# Patient Record
Sex: MALE | Race: BLACK OR AFRICAN AMERICAN | Employment: UNEMPLOYED | URBAN - METROPOLITAN AREA
[De-identification: names, ages, dates, MRNs, and addresses within clinical notes are randomized per-mention and may not be internally consistent; named-entity substitution may affect disease eponyms.]

---

## 2017-03-10 ENCOUNTER — TELEPHONE (OUTPATIENT)
Dept: ADMINISTRATIVE | Age: 67
End: 2017-03-10

## 2017-03-16 ENCOUNTER — TELEPHONE (OUTPATIENT)
Dept: ADMINISTRATIVE | Age: 67
End: 2017-03-16

## 2017-03-17 ENCOUNTER — TELEPHONE (OUTPATIENT)
Dept: ADMINISTRATIVE | Age: 67
End: 2017-03-17

## 2017-05-12 ENCOUNTER — TELEPHONE (OUTPATIENT)
Dept: ADMINISTRATIVE | Age: 67
End: 2017-05-12

## 2017-06-17 ENCOUNTER — HOSPITAL ENCOUNTER (EMERGENCY)
Age: 67
Discharge: HOME | End: 2017-06-17
Attending: EMERGENCY MEDICINE
Payer: MEDICARE

## 2017-06-17 VITALS
TEMPERATURE: 97.7 F | DIASTOLIC BLOOD PRESSURE: 68 MMHG | HEIGHT: 73 IN | RESPIRATION RATE: 16 BRPM | HEART RATE: 101 BPM | SYSTOLIC BLOOD PRESSURE: 138 MMHG | OXYGEN SATURATION: 97 % | BODY MASS INDEX: 21.06 KG/M2 | WEIGHT: 158.9 LBS
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
## 2017-06-17 PROCEDURE — 6370000000 HC RX 637 (ALT 250 FOR IP): Performed by: NURSE PRACTITIONER

## 2017-06-17 PROCEDURE — 6360000002 HC RX W HCPCS: Performed by: NURSE PRACTITIONER

## 2017-06-17 PROCEDURE — 90715 TDAP VACCINE 7 YRS/> IM: CPT | Performed by: NURSE PRACTITIONER

## 2017-06-17 PROCEDURE — 99283 EMERGENCY DEPT VISIT LOW MDM: CPT

## 2017-06-17 PROCEDURE — 90471 IMMUNIZATION ADMIN: CPT | Performed by: NURSE PRACTITIONER

## 2017-06-17 RX ORDER — HYDROCODONE BITARTRATE AND ACETAMINOPHEN 5; 325 MG/1; MG/1
1 TABLET ORAL EVERY 6 HOURS PRN
Qty: 20 TABLET | Refills: 0 | Status: SHIPPED | OUTPATIENT
Start: 2017-06-17 | End: 2017-06-24

## 2017-06-17 RX ORDER — AMOXICILLIN AND CLAVULANATE POTASSIUM 875; 125 MG/1; MG/1
1 TABLET, FILM COATED ORAL 2 TIMES DAILY
Qty: 20 TABLET | Refills: 0 | Status: SHIPPED | OUTPATIENT
Start: 2017-06-17 | End: 2017-06-27

## 2017-06-17 RX ORDER — HYDROCODONE BITARTRATE AND ACETAMINOPHEN 5; 325 MG/1; MG/1
1 TABLET ORAL ONCE
Status: COMPLETED | OUTPATIENT
Start: 2017-06-17 | End: 2017-06-17

## 2017-06-17 RX ADMIN — HYDROCODONE BITARTRATE AND ACETAMINOPHEN 1 TABLET: 5; 325 TABLET ORAL at 12:42

## 2017-06-17 RX ADMIN — TETANUS TOXOID, REDUCED DIPHTHERIA TOXOID AND ACELLULAR PERTUSSIS VACCINE, ADSORBED 0.5 ML: 5; 2.5; 8; 8; 2.5 SUSPENSION INTRAMUSCULAR at 12:31

## 2017-06-17 ASSESSMENT — PAIN DESCRIPTION - ORIENTATION: ORIENTATION: RIGHT

## 2017-06-17 ASSESSMENT — PAIN SCALES - GENERAL
PAINLEVEL_OUTOF10: 7

## 2017-06-17 ASSESSMENT — PAIN DESCRIPTION - DESCRIPTORS: DESCRIPTORS: ACHING

## 2017-06-17 ASSESSMENT — PAIN DESCRIPTION - FREQUENCY: FREQUENCY: CONTINUOUS

## 2017-06-17 ASSESSMENT — PAIN DESCRIPTION - LOCATION: LOCATION: ARM

## 2017-07-06 ENCOUNTER — TELEPHONE (OUTPATIENT)
Dept: ADMINISTRATIVE | Age: 67
End: 2017-07-06

## 2017-10-16 ENCOUNTER — OFFICE VISIT (OUTPATIENT)
Dept: INTERNAL MEDICINE | Age: 67
End: 2017-10-16
Payer: MEDICARE

## 2017-10-16 VITALS
WEIGHT: 161 LBS | DIASTOLIC BLOOD PRESSURE: 73 MMHG | HEIGHT: 73 IN | OXYGEN SATURATION: 99 % | BODY MASS INDEX: 21.34 KG/M2 | HEART RATE: 99 BPM | SYSTOLIC BLOOD PRESSURE: 109 MMHG

## 2017-10-16 DIAGNOSIS — K31.7 POLYP OF DUODENUM: ICD-10-CM

## 2017-10-16 DIAGNOSIS — A09 DIARRHEA OF INFECTIOUS ORIGIN: ICD-10-CM

## 2017-10-16 DIAGNOSIS — M17.0 PRIMARY OSTEOARTHRITIS OF BOTH KNEES: ICD-10-CM

## 2017-10-16 DIAGNOSIS — F10.10 ALCOHOL ABUSE: ICD-10-CM

## 2017-10-16 DIAGNOSIS — N17.9 AKI (ACUTE KIDNEY INJURY) (HCC): ICD-10-CM

## 2017-10-16 DIAGNOSIS — K57.30 DIVERTICULOSIS OF LARGE INTESTINE WITHOUT HEMORRHAGE: ICD-10-CM

## 2017-10-16 DIAGNOSIS — J44.9 STAGE 2 MODERATE COPD BY GOLD CLASSIFICATION (HCC): ICD-10-CM

## 2017-10-16 DIAGNOSIS — N30.00 ACUTE CYSTITIS WITHOUT HEMATURIA: Primary | ICD-10-CM

## 2017-10-16 DIAGNOSIS — Z23 NEED FOR PROPHYLACTIC VACCINATION AGAINST STREPTOCOCCUS PNEUMONIAE (PNEUMOCOCCUS): ICD-10-CM

## 2017-10-16 PROCEDURE — 99214 OFFICE O/P EST MOD 30 MIN: CPT | Performed by: INTERNAL MEDICINE

## 2017-10-16 PROCEDURE — 1123F ACP DISCUSS/DSCN MKR DOCD: CPT | Performed by: INTERNAL MEDICINE

## 2017-10-16 PROCEDURE — 90670 PCV13 VACCINE IM: CPT

## 2017-10-16 PROCEDURE — G8926 SPIRO NO PERF OR DOC: HCPCS | Performed by: INTERNAL MEDICINE

## 2017-10-16 PROCEDURE — 90471 IMMUNIZATION ADMIN: CPT | Performed by: INTERNAL MEDICINE

## 2017-10-16 PROCEDURE — G8420 CALC BMI NORM PARAMETERS: HCPCS | Performed by: INTERNAL MEDICINE

## 2017-10-16 PROCEDURE — 3017F COLORECTAL CA SCREEN DOC REV: CPT | Performed by: INTERNAL MEDICINE

## 2017-10-16 PROCEDURE — 3023F SPIROM DOC REV: CPT | Performed by: INTERNAL MEDICINE

## 2017-10-16 PROCEDURE — 4004F PT TOBACCO SCREEN RCVD TLK: CPT | Performed by: INTERNAL MEDICINE

## 2017-10-16 PROCEDURE — G0444 DEPRESSION SCREEN ANNUAL: HCPCS | Performed by: INTERNAL MEDICINE

## 2017-10-16 PROCEDURE — 99214 OFFICE O/P EST MOD 30 MIN: CPT

## 2017-10-16 PROCEDURE — G0009 ADMIN PNEUMOCOCCAL VACCINE: HCPCS | Performed by: INTERNAL MEDICINE

## 2017-10-16 PROCEDURE — G8427 DOCREV CUR MEDS BY ELIG CLIN: HCPCS | Performed by: INTERNAL MEDICINE

## 2017-10-16 PROCEDURE — 4040F PNEUMOC VAC/ADMIN/RCVD: CPT | Performed by: INTERNAL MEDICINE

## 2017-10-16 PROCEDURE — G8484 FLU IMMUNIZE NO ADMIN: HCPCS | Performed by: INTERNAL MEDICINE

## 2017-10-16 RX ORDER — CIPROFLOXACIN 500 MG/1
500 TABLET, FILM COATED ORAL 2 TIMES DAILY
Qty: 14 TABLET | Refills: 0 | Status: SHIPPED | OUTPATIENT
Start: 2017-10-16 | End: 2017-10-23

## 2017-10-16 RX ORDER — SULFAMETHOXAZOLE AND TRIMETHOPRIM 800; 160 MG/1; MG/1
1 TABLET ORAL
COMMUNITY
Start: 2017-10-15 | End: 2017-10-16

## 2017-10-16 ASSESSMENT — PATIENT HEALTH QUESTIONNAIRE - PHQ9
4. FEELING TIRED OR HAVING LITTLE ENERGY: 2
7. TROUBLE CONCENTRATING ON THINGS, SUCH AS READING THE NEWSPAPER OR WATCHING TELEVISION: 0
SUM OF ALL RESPONSES TO PHQ9 QUESTIONS 1 & 2: 2
9. THOUGHTS THAT YOU WOULD BE BETTER OFF DEAD, OR OF HURTING YOURSELF: 0
8. MOVING OR SPEAKING SO SLOWLY THAT OTHER PEOPLE COULD HAVE NOTICED. OR THE OPPOSITE, BEING SO FIGETY OR RESTLESS THAT YOU HAVE BEEN MOVING AROUND A LOT MORE THAN USUAL: 0
10. IF YOU CHECKED OFF ANY PROBLEMS, HOW DIFFICULT HAVE THESE PROBLEMS MADE IT FOR YOU TO DO YOUR WORK, TAKE CARE OF THINGS AT HOME, OR GET ALONG WITH OTHER PEOPLE: 1
5. POOR APPETITE OR OVEREATING: 1
1. LITTLE INTEREST OR PLEASURE IN DOING THINGS: 1
3. TROUBLE FALLING OR STAYING ASLEEP: 1
2. FEELING DOWN, DEPRESSED OR HOPELESS: 1
6. FEELING BAD ABOUT YOURSELF - OR THAT YOU ARE A FAILURE OR HAVE LET YOURSELF OR YOUR FAMILY DOWN: 0
SUM OF ALL RESPONSES TO PHQ QUESTIONS 1-9: 6

## 2017-10-16 NOTE — PATIENT INSTRUCTIONS
You have been given an order for a x-ray. Please have x-ray performed at [place], there is no need to schedule test.    You have been given a lab order no need to schedule      Your referral for Gastroenterology was sent to [address] [place] [address] GI you will be contacted with an appt. Avs was given and reviewed appt card given with next appt.  GISELE

## 2017-10-16 NOTE — PROGRESS NOTES
[facility]    Progress Note    Date of patient's visit: 10/16/2017    Patient's Name:  Evan Queen    YOB: 1950            Patient Care Team:  Glenn Boyer MD as PCP - General (Internal Medicine)    REASON FOR VISIT: Routine outpatient follow     Chief Complaint   Patient presents with    Knee Pain     patient present for same day with bilateral knee pain     Follow-Up from [address] [place] [address] states that he went to [facility] er for abdominal pain and diarrhea           HISTORY OF PRESENT ILLNESS:    History was obtained from the patient. Evan Queen is a 78 y.o. is here for Follow-up after more than a year. He was recently [facility] emergency room for diarrhea and abdominal pain. He was also diagnosed with acute cystitis. His creatinine was elevated. He was discharged home on Bactrim. He says he is still having some diarrhea. .  No fever. No blood in his stools. He did not have leukocytosis. He denies eating outside for the last month. He says he had a salad and well cooked chicken over the weekend before he went to the hospital.  Says diarrhea started about 4 days ago. He denies urinary frequency or hematuria. He has taken 3 doses of Bactrim so far. Last time he was having blood in his stools. FIT test was positive. He was referred to surgery and GI but he never followed up. He had a CT in the hospital which showed lipoma in duodenum and diverticulosis. He canceled all his appointments with specialists last year. he is complaining today of bilateral knee pain. This has been going on for years. He says he used to see a physician who was doing the injections for him. For the last few years she says he has been drinking to help with the pain. He says sometimes his knee swelled up. He is not sure if he has ever been told he has gout. He continues to drink at least 3-4 beers daily. He is a smoker. He has COPD.   He had PFTs done last year. He denies any worsening of shortness of breath or cough.         Component Value Ref Range   Color YELLOW YELLOW   Turbidity HAZY (A) CLEAR   Specific gravity 1.009 1.003 - 1.035   Nitrite Positive (A) Negative   Ph urine 7.0 5.0 - 8.5   Leukocyte esterase Large (A) Negative   Protein Trace (A) Negative mg/dL   Glucose, Ur Negative Negative mg/dL   Ketones urine Negative Negative mg/dL   Urobilinogen 0.2 <1.1 eu/dL   Bilirubin, urine Negative Negative   Hemoglobin Small (A) Negative   RBC 6 (H) 0 - 5 /hpf   Squamous epithelium 2 0 - 5 /hpf   Bacteria RARE (A) NONE    (H) 0 - 5 /hpf     ADD ON Urinalysis (if urine dip already completed) (10/15/2017 12:53 PM)   Specimen Performing Laboratory   [facility] [address]   [address], [place] [address]     Back to top of Lab Results      Nursing Urine Macroscopic (10/15/2017 12:04 PM)  Nursing Urine Macroscopic (10/15/2017 12:04 PM)   Component Value Ref Range   Specific gravity RANDAL 1.010 1.003 - 1.035   Leukocyte esterase RANDAL Small (A) Negative   Nitrite RANDAL Positive (A) Negative   Ph 6.0 5.0 - 8.5   Protein RANDAL 30 (A) Negative mg/dL   Glucose RANDAL Negative Negative mg/dL   Ketones RANDAL Trace (A) Negative mg/dL   Urobilinogen RANDAL 0.2 <1.1 eu/dL   Bilirubin RANDAL Negative Negative   Hemoglobin RANDAL Small (A) Negative     Nursing Urine Macroscopic (10/15/2017 12:04 PM)   Specimen Performing Laboratory     [facility]     Back to top of Lab Results      C difficile by PCR (10/15/2017 12:00 PM)  C difficile by PCR (10/15/2017 12:00 PM)   Component Value Ref Range   Toxigenic c diff Negative Negative   027 nap1 Presumptive Negative Presumptive Negative     C difficile by PCR (10/15/2017 12:00 PM)   Specimen Performing Laboratory   [facility] [address]   [address], [place] [address]     Back to top of Lab Results      GI panel(stool pathogen panel) (10/15/2017 12:00 PM)  GI panel(stool pathogen % basophils 0.5 %   Neutrophils Absolute 3.6 1.5 - 6.6 X10E9/L   Lymphocytes Absolute 0.8 (L) 1.0 - 3.5 X10E9/L   Monocytes Absolute 0.5 0 - 0.9 X10E9/L   Eosinophils Absolute 0.1 0.0 - 0.4 X10E9/L   Basophils Absolute 0.0 0 - 0.9 X10E9/L     CBC auto differential (10/15/2017 10:55 AM)   Specimen Performing Laboratory   [facility]   [address], [place] [address]     Back to top of Lab Results      Imaging Results  - in this encounter    Table of Contents for Imaging Results  CT abdomen and pelvis with contrast (10/15/2017 1:50 PM)  X-ray abdomen complete series with pa chest (10/15/2017 11:16 AM)       CT abdomen and pelvis with contrast (10/15/2017 1:50 PM)  CT abdomen and pelvis with contrast (10/15/2017 1:50 PM)   Specimen Performing Laboratory     [facility]       CT abdomen and pelvis with contrast (10/15/2017 1:50 PM)   Narrative           STUDY: ABDOMEN AND PELVIS CT WITH CONTRAST         CLINICAL HISTORY: Left mid abdomen pain for 4 days nausea vomiting and diarrhea        COMPARISON: None.        TECHNIQUE: CT abdomen and pelvis was performed utilizing 5 mm axial reconstructions following the uneventful administration of 100 cc Omnipaque 300 nonionic intravenous contrast. Coronal and sagittal reformatted images as well as delayed excretory phase images were obtained and reviewed.  Automated     exposure control was utilized.        FINDINGS:        ABDOMEN:        The lung bases are unremarkable.  The liver, gallbladder, spleen, pancreas, adrenal glands, and kidneys are normal.  There is a 5 cm lipoma in the duodenum.  There is diverticulosis in the colon.  There is a hiatal hernia.  There is mild left renal hilar retroperitoneal adenopathy.  The appendix is     not visualized.  The bowel in the pelvis is normal.  There is no adenopathy or free fluid.  The bladder is normal.  There is L5-S1 disc height loss.        Impression:        Large intraluminal duodenal medications on file prior to visit. SOCIAL HISTORY    Reviewed and no change from previous record. Dustin  reports that he has been smoking Cigarettes. He has a 25.00 pack-year smoking history. He has never used smokeless tobacco.    FAMILY HISTORY:    Reviewed and No change from previous visit    HEALTH MAINTENANCE DUE:      Health Maintenance Due   Topic Date Due    AAA screen  1950    Pneumococcal low/med risk (1 of 2 - PCV13) 10/07/2016    Colon Cancer Screen FIT/FOBT  06/14/2017       REVIEW OF SYSTEMS:    12 point review of symptoms completed and found to be normal except noted in the HPI    Review of Systems   Constitutional: Negative for chills, fever and malaise/fatigue. HENT: Negative for congestion and hearing loss. Eyes: Negative for blurred vision and redness. Respiratory: Negative for cough, sputum production and shortness of breath. Cardiovascular: Negative for chest pain, palpitations and leg swelling. Gastrointestinal: Positive for abdominal pain and diarrhea. Negative for blood in stool, constipation, heartburn, melena and nausea. Genitourinary: Negative for dysuria and frequency. Musculoskeletal: Positive for joint pain. Negative for back pain and falls. Skin: Negative for itching and rash. Neurological: Negative for dizziness and loss of consciousness. Psychiatric/Behavioral: Positive for substance abuse. Negative for depression. PHYSICAL EXAM:      Vitals:    10/16/17 1122   BP: 109/73   Site: Left Arm   Position: Sitting   Cuff Size: Medium Adult   Pulse: 99   SpO2: 99%   Weight: 161 lb (73 kg)   Height: 6' 1\" (1.854 m)     Body mass index is 21.24 kg/m².     BP Readings from Last 3 Encounters:   10/16/17 109/73   06/17/17 138/68   06/07/16 128/83        Wt Readings from Last 3 Encounters:   10/16/17 161 lb (73 kg)   06/17/17 158 lb 14.4 oz (72.1 kg)   06/07/16 180 lb (81.6 kg)       Physical Exam      HENT:  Normocephalic, Atraumatic, Bilateral Physician - Ricky  10/16/2017, 11:41 AM

## 2017-10-18 ENCOUNTER — APPOINTMENT (OUTPATIENT)
Dept: CT IMAGING | Age: 67
DRG: 917 | End: 2017-10-18
Payer: MEDICARE

## 2017-10-18 ENCOUNTER — APPOINTMENT (OUTPATIENT)
Dept: GENERAL RADIOLOGY | Age: 67
DRG: 917 | End: 2017-10-18
Payer: MEDICARE

## 2017-10-18 ENCOUNTER — HOSPITAL ENCOUNTER (INPATIENT)
Age: 67
LOS: 1 days | Discharge: LEFT BEFORE DISCHARGE | DRG: 917 | End: 2017-10-19
Attending: EMERGENCY MEDICINE | Admitting: INTERNAL MEDICINE
Payer: MEDICARE

## 2017-10-18 DIAGNOSIS — F10.929 ACUTE ALCOHOLIC INTOXICATION WITH COMPLICATION (HCC): ICD-10-CM

## 2017-10-18 DIAGNOSIS — R41.82 ALTERED MENTAL STATUS, UNSPECIFIED ALTERED MENTAL STATUS TYPE: Primary | ICD-10-CM

## 2017-10-18 DIAGNOSIS — R79.89 ELEVATED LACTIC ACID LEVEL: ICD-10-CM

## 2017-10-18 PROBLEM — N17.9 AKI (ACUTE KIDNEY INJURY) (HCC): Status: ACTIVE | Noted: 2017-10-18

## 2017-10-18 PROBLEM — G92.9 ENCEPHALOPATHY, TOXIC: Status: ACTIVE | Noted: 2017-10-18

## 2017-10-18 PROBLEM — F10.920 ALCOHOLIC INTOXICATION WITHOUT COMPLICATION (HCC): Status: ACTIVE | Noted: 2017-10-18

## 2017-10-18 PROBLEM — F19.920 SUBSTANCE INTOXICATION WITHOUT COMPLICATION (HCC): Status: ACTIVE | Noted: 2017-10-18

## 2017-10-18 LAB
ABSOLUTE EOS #: 0.2 K/UL (ref 0–0.4)
ABSOLUTE IMMATURE GRANULOCYTE: ABNORMAL K/UL (ref 0–0.3)
ABSOLUTE LYMPH #: 2.3 K/UL (ref 1–4.8)
ABSOLUTE MONO #: 0.5 K/UL (ref 0.1–1.2)
ACETAMINOPHEN LEVEL: <10 UG/ML (ref 10–30)
ALBUMIN SERPL-MCNC: 3.2 G/DL (ref 3.5–5.2)
ALBUMIN/GLOBULIN RATIO: 1.1 (ref 1–2.5)
ALLEN TEST: ABNORMAL
ALP BLD-CCNC: 68 U/L (ref 40–129)
ALT SERPL-CCNC: 12 U/L (ref 5–41)
ANION GAP SERPL CALCULATED.3IONS-SCNC: 15 MMOL/L (ref 9–17)
ANION GAP: 9 MMOL/L (ref 7–16)
AST SERPL-CCNC: 25 U/L
BASOPHILS # BLD: 1 %
BASOPHILS ABSOLUTE: 0 K/UL (ref 0–0.2)
BILIRUB SERPL-MCNC: 0.52 MG/DL (ref 0.3–1.2)
BILIRUBIN DIRECT: 0.17 MG/DL
BILIRUBIN, INDIRECT: 0.35 MG/DL (ref 0–1)
BUN BLDV-MCNC: 9 MG/DL (ref 8–23)
BUN/CREAT BLD: ABNORMAL (ref 9–20)
CALCIUM SERPL-MCNC: 7.7 MG/DL (ref 8.6–10.4)
CHLORIDE BLD-SCNC: 104 MMOL/L (ref 98–107)
CO2: 17 MMOL/L (ref 20–31)
CREAT SERPL-MCNC: 1.24 MG/DL (ref 0.7–1.2)
DIFFERENTIAL TYPE: ABNORMAL
EOSINOPHILS RELATIVE PERCENT: 4 %
ETHANOL PERCENT: 0.18 %
ETHANOL: 185 MG/DL
FIO2: ABNORMAL
GFR AFRICAN AMERICAN: >60 ML/MIN
GFR NON-AFRICAN AMERICAN: 55 ML/MIN
GFR NON-AFRICAN AMERICAN: 58 ML/MIN
GFR SERPL CREATININE-BSD FRML MDRD: >60 ML/MIN
GFR SERPL CREATININE-BSD FRML MDRD: ABNORMAL ML/MIN/{1.73_M2}
GLOBULIN: ABNORMAL G/DL (ref 1.5–3.8)
GLUCOSE BLD-MCNC: 148 MG/DL (ref 74–100)
GLUCOSE BLD-MCNC: 149 MG/DL (ref 70–99)
HCO3 VENOUS: 17.9 MMOL/L (ref 22–29)
HCT VFR BLD CALC: 34.3 % (ref 41–53)
HEMOGLOBIN: 11.5 G/DL (ref 13.5–17.5)
IMMATURE GRANULOCYTES: ABNORMAL %
INR BLD: 1.3
LYMPHOCYTES # BLD: 51 %
MCH RBC QN AUTO: 32.5 PG (ref 26–34)
MCHC RBC AUTO-ENTMCNC: 33.5 G/DL (ref 31–37)
MCV RBC AUTO: 96.8 FL (ref 80–100)
MODE: ABNORMAL
MONOCYTES # BLD: 10 %
NEGATIVE BASE EXCESS, VEN: 8 (ref 0–2)
O2 DEVICE/FLOW/%: ABNORMAL
O2 SAT, VEN: 97 % (ref 60–85)
PARTIAL THROMBOPLASTIN TIME: 23.7 SEC (ref 21.3–31.3)
PATIENT TEMP: ABNORMAL
PCO2, VEN: 37.1 MM HG (ref 41–51)
PDW BLD-RTO: 13.3 % (ref 12.5–15.4)
PH VENOUS: 7.29 (ref 7.32–7.43)
PLATELET # BLD: 195 K/UL (ref 140–450)
PLATELET ESTIMATE: ABNORMAL
PMV BLD AUTO: 7.9 FL (ref 6–12)
PO2, VEN: 101.5 MM HG (ref 30–50)
POC CHLORIDE: 111 MMOL/L (ref 98–107)
POC CREATININE: 1.31 MG/DL (ref 0.51–1.19)
POC HEMATOCRIT: 34 % (ref 41–53)
POC HEMOGLOBIN: 11.7 G/DL (ref 13.5–17.5)
POC IONIZED CALCIUM: 1.07 MMOL/L (ref 1.15–1.33)
POC LACTIC ACID: 3.59 MMOL/L (ref 0.56–1.39)
POC PCO2 TEMP: ABNORMAL MM HG
POC PH TEMP: ABNORMAL
POC PO2 TEMP: ABNORMAL MM HG
POC POTASSIUM: 3.4 MMOL/L (ref 3.5–4.5)
POC SODIUM: 138 MMOL/L (ref 138–146)
POC TROPONIN I: 0 NG/ML (ref 0–0.1)
POC TROPONIN INTERP: NORMAL
POSITIVE BASE EXCESS, VEN: ABNORMAL (ref 0–3)
POTASSIUM SERPL-SCNC: 3.5 MMOL/L (ref 3.7–5.3)
PROTHROMBIN TIME: 13.8 SEC (ref 9.4–12.6)
RBC # BLD: 3.55 M/UL (ref 4.5–5.9)
RBC # BLD: ABNORMAL 10*6/UL
SALICYLATE LEVEL: <1 MG/DL (ref 3–10)
SAMPLE SITE: ABNORMAL
SEG NEUTROPHILS: 34 %
SEGMENTED NEUTROPHILS ABSOLUTE COUNT: 1.6 K/UL (ref 1.8–7.7)
SODIUM BLD-SCNC: 136 MMOL/L (ref 135–144)
TOTAL CO2, VENOUS: 19 MMOL/L (ref 23–30)
TOTAL PROTEIN: 6.1 G/DL (ref 6.4–8.3)
TOXIC TRICYCLIC SC,BLOOD: NEGATIVE
WBC # BLD: 4.7 K/UL (ref 3.5–11)
WBC # BLD: ABNORMAL 10*3/UL

## 2017-10-18 PROCEDURE — 85014 HEMATOCRIT: CPT

## 2017-10-18 PROCEDURE — 80307 DRUG TEST PRSMV CHEM ANLYZR: CPT

## 2017-10-18 PROCEDURE — 80076 HEPATIC FUNCTION PANEL: CPT

## 2017-10-18 PROCEDURE — 82435 ASSAY OF BLOOD CHLORIDE: CPT

## 2017-10-18 PROCEDURE — 81001 URINALYSIS AUTO W/SCOPE: CPT

## 2017-10-18 PROCEDURE — 70450 CT HEAD/BRAIN W/O DYE: CPT

## 2017-10-18 PROCEDURE — 84132 ASSAY OF SERUM POTASSIUM: CPT

## 2017-10-18 PROCEDURE — 82565 ASSAY OF CREATININE: CPT

## 2017-10-18 PROCEDURE — 2060000000 HC ICU INTERMEDIATE R&B

## 2017-10-18 PROCEDURE — 84484 ASSAY OF TROPONIN QUANT: CPT

## 2017-10-18 PROCEDURE — 71010 XR CHEST PORTABLE: CPT

## 2017-10-18 PROCEDURE — 85610 PROTHROMBIN TIME: CPT

## 2017-10-18 PROCEDURE — 82330 ASSAY OF CALCIUM: CPT

## 2017-10-18 PROCEDURE — 82947 ASSAY GLUCOSE BLOOD QUANT: CPT

## 2017-10-18 PROCEDURE — 84295 ASSAY OF SERUM SODIUM: CPT

## 2017-10-18 PROCEDURE — 2580000003 HC RX 258: Performed by: EMERGENCY MEDICINE

## 2017-10-18 PROCEDURE — 87389 HIV-1 AG W/HIV-1&-2 AB AG IA: CPT

## 2017-10-18 PROCEDURE — 72125 CT NECK SPINE W/O DYE: CPT

## 2017-10-18 PROCEDURE — 80048 BASIC METABOLIC PNL TOTAL CA: CPT

## 2017-10-18 PROCEDURE — 83605 ASSAY OF LACTIC ACID: CPT

## 2017-10-18 PROCEDURE — 85025 COMPLETE CBC W/AUTO DIFF WBC: CPT

## 2017-10-18 PROCEDURE — 87086 URINE CULTURE/COLONY COUNT: CPT

## 2017-10-18 PROCEDURE — 82803 BLOOD GASES ANY COMBINATION: CPT

## 2017-10-18 PROCEDURE — 2580000003 HC RX 258: Performed by: STUDENT IN AN ORGANIZED HEALTH CARE EDUCATION/TRAINING PROGRAM

## 2017-10-18 PROCEDURE — 93005 ELECTROCARDIOGRAM TRACING: CPT

## 2017-10-18 PROCEDURE — 87040 BLOOD CULTURE FOR BACTERIA: CPT

## 2017-10-18 PROCEDURE — 2500000003 HC RX 250 WO HCPCS: Performed by: STUDENT IN AN ORGANIZED HEALTH CARE EDUCATION/TRAINING PROGRAM

## 2017-10-18 PROCEDURE — 99285 EMERGENCY DEPT VISIT HI MDM: CPT

## 2017-10-18 PROCEDURE — G0480 DRUG TEST DEF 1-7 CLASSES: HCPCS

## 2017-10-18 PROCEDURE — 85730 THROMBOPLASTIN TIME PARTIAL: CPT

## 2017-10-18 PROCEDURE — 6360000002 HC RX W HCPCS: Performed by: STUDENT IN AN ORGANIZED HEALTH CARE EDUCATION/TRAINING PROGRAM

## 2017-10-18 RX ORDER — 0.9 % SODIUM CHLORIDE 0.9 %
1000 INTRAVENOUS SOLUTION INTRAVENOUS ONCE
Status: COMPLETED | OUTPATIENT
Start: 2017-10-18 | End: 2017-10-18

## 2017-10-18 RX ORDER — 0.9 % SODIUM CHLORIDE 0.9 %
1000 INTRAVENOUS SOLUTION INTRAVENOUS ONCE
Status: DISCONTINUED | OUTPATIENT
Start: 2017-10-18 | End: 2017-10-19 | Stop reason: HOSPADM

## 2017-10-18 RX ORDER — POTASSIUM CHLORIDE 20MEQ/15ML
40 LIQUID (ML) ORAL PRN
Status: DISCONTINUED | OUTPATIENT
Start: 2017-10-18 | End: 2017-10-19 | Stop reason: HOSPADM

## 2017-10-18 RX ORDER — POTASSIUM CHLORIDE 7.45 MG/ML
10 INJECTION INTRAVENOUS PRN
Status: DISCONTINUED | OUTPATIENT
Start: 2017-10-18 | End: 2017-10-19 | Stop reason: HOSPADM

## 2017-10-18 RX ORDER — SODIUM CHLORIDE 9 MG/ML
INJECTION, SOLUTION INTRAVENOUS CONTINUOUS
Status: DISCONTINUED | OUTPATIENT
Start: 2017-10-18 | End: 2017-10-19 | Stop reason: HOSPADM

## 2017-10-18 RX ORDER — POTASSIUM CHLORIDE 20 MEQ/1
40 TABLET, EXTENDED RELEASE ORAL PRN
Status: DISCONTINUED | OUTPATIENT
Start: 2017-10-18 | End: 2017-10-19 | Stop reason: HOSPADM

## 2017-10-18 RX ORDER — ACETAMINOPHEN 325 MG/1
650 TABLET ORAL EVERY 4 HOURS PRN
Status: DISCONTINUED | OUTPATIENT
Start: 2017-10-18 | End: 2017-10-19 | Stop reason: HOSPADM

## 2017-10-18 RX ORDER — CIPROFLOXACIN 2 MG/ML
400 INJECTION, SOLUTION INTRAVENOUS EVERY 12 HOURS
Status: DISCONTINUED | OUTPATIENT
Start: 2017-10-18 | End: 2017-10-19 | Stop reason: HOSPADM

## 2017-10-18 RX ORDER — SODIUM CHLORIDE 0.9 % (FLUSH) 0.9 %
10 SYRINGE (ML) INJECTION PRN
Status: DISCONTINUED | OUTPATIENT
Start: 2017-10-18 | End: 2017-10-19 | Stop reason: HOSPADM

## 2017-10-18 RX ORDER — LORAZEPAM 2 MG/ML
0.5 INJECTION INTRAMUSCULAR EVERY 6 HOURS PRN
Status: DISCONTINUED | OUTPATIENT
Start: 2017-10-18 | End: 2017-10-19 | Stop reason: HOSPADM

## 2017-10-18 RX ORDER — LORAZEPAM 2 MG/ML
1 INJECTION INTRAMUSCULAR EVERY 6 HOURS PRN
Status: DISCONTINUED | OUTPATIENT
Start: 2017-10-18 | End: 2017-10-19 | Stop reason: HOSPADM

## 2017-10-18 RX ORDER — SODIUM CHLORIDE 0.9 % (FLUSH) 0.9 %
10 SYRINGE (ML) INJECTION EVERY 12 HOURS SCHEDULED
Status: DISCONTINUED | OUTPATIENT
Start: 2017-10-18 | End: 2017-10-19 | Stop reason: HOSPADM

## 2017-10-18 RX ORDER — ONDANSETRON 2 MG/ML
4 INJECTION INTRAMUSCULAR; INTRAVENOUS EVERY 6 HOURS PRN
Status: DISCONTINUED | OUTPATIENT
Start: 2017-10-18 | End: 2017-10-19 | Stop reason: HOSPADM

## 2017-10-18 RX ADMIN — SODIUM CHLORIDE 1000 ML: 9 INJECTION, SOLUTION INTRAVENOUS at 18:01

## 2017-10-18 RX ADMIN — FOLIC ACID: 5 INJECTION, SOLUTION INTRAMUSCULAR; INTRAVENOUS; SUBCUTANEOUS at 22:19

## 2017-10-18 RX ADMIN — SODIUM CHLORIDE: 9 INJECTION, SOLUTION INTRAVENOUS at 22:20

## 2017-10-18 RX ADMIN — CIPROFLOXACIN 400 MG: 2 INJECTION, SOLUTION INTRAVENOUS at 22:19

## 2017-10-18 RX ADMIN — SODIUM CHLORIDE 1000 ML: 9 INJECTION, SOLUTION INTRAVENOUS at 18:02

## 2017-10-18 RX ADMIN — Medication 10 ML: at 22:19

## 2017-10-18 ASSESSMENT — ENCOUNTER SYMPTOMS
GASTROINTESTINAL NEGATIVE: 1
EYES NEGATIVE: 1
RESPIRATORY NEGATIVE: 1

## 2017-10-18 ASSESSMENT — PAIN SCALES - GENERAL: PAINLEVEL_OUTOF10: 0

## 2017-10-18 NOTE — ED NOTES
Pt placed in c-collar per . Per EMS report, pt was smoking K2 with friend and drank a beer. Pt then passed out. Unknown if pt hit head. Pt responding to voice. Pt cooperative, answering questions appropriately. Pt hypotensive. NS bolus infusing WO, second IV initiated.       Jordon Suarez RN  10/18/17 [id number]

## 2017-10-18 NOTE — ED PROVIDER NOTES
 Drug use:      Types: Cocaine, Marijuana      Comment: Pt. states hasn't used for last 6 months    Sexual activity: Not on file     Other Topics Concern    Not on file     Social History Narrative    No narrative on file       Family History   Problem Relation Age of Onset    Other Mother      homicide    Heart Disease Sister     Kidney Disease Sister     Other Sister      drug abuse    Other Brother      ivda       Allergies:  Review of patient's allergies indicates no known allergies. Home Medications:  Prior to Admission medications    Medication Sig Start Date End Date Taking? Authorizing Provider   ciprofloxacin (CIPRO) 500 MG tablet Take 1 tablet by mouth 2 times daily for 7 days 10/16/17 10/23/17  Hayde Martinez MD       REVIEW OF SYSTEMS    (2-9 systems for level 4, 10 or more for level 5)      Review of Systems   Unable to perform ROS: Mental status change       PHYSICAL EXAM   (up to 7 for level 4, 8 or more for level 5)      INITIAL VITALS:   BP (!) 102/57   Pulse 88   Temp 95.5 °F (35.3 °C) (Axillary)   Resp 22   Wt 161 lb (73 kg)   SpO2 96%   BMI 21.24 kg/m²     Physical Exam   Constitutional: He is oriented to person, place, and time. He appears well-developed and well-nourished. HENT:   Head: Normocephalic and atraumatic. Right Ear: External ear normal.   Left Ear: External ear normal.   Mouth/Throat: Oropharynx is clear and moist.   Eyes: Conjunctivae and EOM are normal. Pupils are equal, round, and reactive to light. Right eye exhibits no discharge. Left eye exhibits no discharge. Neck: No tracheal deviation present. Cervical collar placed on arrival to ED   Cardiovascular: Normal rate. Pulmonary/Chest: Effort normal and breath sounds normal. No respiratory distress. He has no wheezes. He has no rales. Abdominal: Soft. Bowel sounds are normal. There is no tenderness. Musculoskeletal: Normal range of motion. He exhibits no deformity.    Neurological: He is alert Absolute Eos # 0.20 0.0 - 0.4 k/uL    Basophils # 0.00 0.0 - 0.2 k/uL    Absolute Immature Granulocyte NOT REPORTED 0.00 - 0.30 k/uL    WBC Morphology NOT REPORTED     RBC Morphology NOT REPORTED     Platelet Estimate NOT REPORTED    BASIC METABOLIC PANEL   Result Value Ref Range    Glucose 149 (H) 70 - 99 mg/dL    BUN 9 8 - 23 mg/dL    CREATININE 1.24 (H) 0.70 - 1.20 mg/dL    Bun/Cre Ratio NOT REPORTED 9 - 20    Calcium 7.7 (L) 8.6 - 10.4 mg/dL    Sodium 136 135 - 144 mmol/L    Potassium 3.5 (L) 3.7 - 5.3 mmol/L    Chloride 104 98 - 107 mmol/L    CO2 17 (L) 20 - 31 mmol/L    Anion Gap 15 9 - 17 mmol/L    GFR Non-African American 58 (L) >60 mL/min    GFR African American >60 >60 mL/min    GFR Comment          GFR Staging NOT REPORTED    TOX SCR, BLD, ED   Result Value Ref Range    Ethanol 185 (H) <10 mg/dL    Ethanol percent 7.260 %    Salicylate Lvl <1 (L) 3 - 10 mg/dL    Acetaminophen Level <10 (L) 10 - 30 ug/mL    Toxic Tricyclic Sc,Blood NEGATIVE NEG   Protime-INR   Result Value Ref Range    Protime 13.8 (H) 9.4 - 12.6 sec    INR 1.3    APTT   Result Value Ref Range    PTT 23.7 21.3 - 31.3 sec   HEPATIC FUNCTION PANEL   Result Value Ref Range    Alb 3.2 (L) 3.5 - 5.2 g/dL    Alkaline Phosphatase 68 40 - 129 U/L    ALT 12 5 - 41 U/L    AST 25 <40 U/L    Total Bilirubin 0.52 0.3 - 1.2 mg/dL    Bilirubin, Direct 0.17 <0.31 mg/dL    Bilirubin, Indirect 0.35 0.00 - 1.00 mg/dL    Total Protein 6.1 (L) 6.4 - 8.3 g/dL    Globulin NOT REPORTED 1.5 - 3.8 g/dL    Albumin/Globulin Ratio 1.1 1.0 - 2.5   Hemoglobin and hematocrit, blood   Result Value Ref Range    POC Hemoglobin 11.7 (L) 13.5 - 17.5 g/dL    POC Hematocrit 34 (L) 41 - 53 %   SODIUM (POC)   Result Value Ref Range    POC Sodium 138 138 - 146 mmol/L   POTASSIUM (POC)   Result Value Ref Range    POC Potassium 3.4 (L) 3.5 - 4.5 mmol/L   CHLORIDE (POC)   Result Value Ref Range    POC Chloride 111 (H) 98 - 107 mmol/L   CALCIUM, IONIC (POC)   Result Value Ref Range POC Ionized Calcium 1.07 (L) 1.15 - 1.33 mmol/L   Venous Blood Gas, POC   Result Value Ref Range    pH, Tristian 7.290 (L) 7.320 - 7.430    pCO2, Tristian 37.1 (L) 41.0 - 51.0 mm Hg    pO2, Tristian 101.5 (H) 30.0 - 50.0 mm Hg    HCO3, Venous 17.9 (L) 22.0 - 29.0 mmol/L    Total CO2, Venous 19 (L) 23.0 - 30.0 mmol/L    Negative Base Excess, Tristian 8 (H) 0.0 - 2.0    Positive Base Excess, Tristian NOT REPORTED 0.0 - 3.0    O2 Sat, Tristian 97 (H) 60.0 - 85.0 %    O2 Device/Flow/% NOT REPORTED     Germán Test NOT REPORTED     Sample Site NOT REPORTED     Mode NOT REPORTED     FIO2 NOT REPORTED     Pt Temp NOT REPORTED     POC pH Temp NOT REPORTED     POC pCO2 Temp NOT REPORTED mm Hg    POC pO2 Temp NOT REPORTED mm Hg   Creatinine W/GFR Point of Care   Result Value Ref Range    POC Creatinine 1.31 (H) 0.51 - 1.19 mg/dL    GFR Comment >60 >60 mL/min    GFR Non- 55 (L) >60 mL/min    GFR Comment         Lactic Acid, POC   Result Value Ref Range    POC Lactic Acid 3.59 (H) 0.56 - 1.39 mmol/L   POCT Glucose   Result Value Ref Range    POC Glucose 148 (H) 74 - 100 mg/dL   Anion Gap (Calc) POC   Result Value Ref Range    Anion Gap 9 7 - 16 mmol/L       IMPRESSION: AMS 67yoM, hypotension, no obvious trauma. GCS 12. PERRL 5mm bilat, no hemotympanum, no obvious oral cavity trauma. Clear lungs bilat, normal cardiac sounds, abd snt nondistended. Moves all four extrem distally to command. Hypotensive. Broad workup: infectious vs traumatic vs intracranial vs cardiogenic - POC chem/formal labs, coags/cultures 2/2 meeting SIRS criteria on presentation. EKG/trop2x/CXR. Fluid bolus(es) to correct hypotension. Despite AMS, I do not feel this patient requires invasive airway given GCS 12 and no evidence of airway compromise. Pt MAP corrected w 1.5L NS - now 75. ETOH 185; elevated, but not enough so to account for degree of AMS. Initial lactate elev - repeat in 3hrs. SIRS, but no sepsis given no suspected/confirmed source of infection.

## 2017-10-18 NOTE — ED NOTES
Bed: 01  Expected date:   Expected time:   Means of arrival:   Comments:  LS 1     Jeff Carmona RN  10/18/17 0825

## 2017-10-19 VITALS
DIASTOLIC BLOOD PRESSURE: 94 MMHG | BODY MASS INDEX: 21.2 KG/M2 | OXYGEN SATURATION: 97 % | HEART RATE: 65 BPM | SYSTOLIC BLOOD PRESSURE: 145 MMHG | TEMPERATURE: 98.6 F | HEIGHT: 73 IN | RESPIRATION RATE: 18 BRPM | WEIGHT: 160 LBS

## 2017-10-19 LAB
-: ABNORMAL
ABSOLUTE EOS #: 0.1 K/UL (ref 0–0.4)
ABSOLUTE IMMATURE GRANULOCYTE: ABNORMAL K/UL (ref 0–0.3)
ABSOLUTE LYMPH #: 1.4 K/UL (ref 1–4.8)
ABSOLUTE MONO #: 0.6 K/UL (ref 0.1–1.2)
AMORPHOUS: ABNORMAL
AMPHETAMINE SCREEN URINE: NEGATIVE
ANION GAP SERPL CALCULATED.3IONS-SCNC: 11 MMOL/L (ref 9–17)
BACTERIA: ABNORMAL
BARBITURATE SCREEN URINE: NEGATIVE
BASOPHILS # BLD: 1 %
BASOPHILS ABSOLUTE: 0 K/UL (ref 0–0.2)
BENZODIAZEPINE SCREEN, URINE: NEGATIVE
BILIRUBIN URINE: NEGATIVE
BUN BLDV-MCNC: 7 MG/DL (ref 8–23)
BUN/CREAT BLD: ABNORMAL (ref 9–20)
BUPRENORPHINE URINE: NORMAL
CALCIUM SERPL-MCNC: 7.4 MG/DL (ref 8.6–10.4)
CANNABINOID SCREEN URINE: NEGATIVE
CASTS UA: ABNORMAL /LPF (ref 0–2)
CHLORIDE BLD-SCNC: 107 MMOL/L (ref 98–107)
CO2: 19 MMOL/L (ref 20–31)
COCAINE METABOLITE, URINE: NEGATIVE
COLOR: YELLOW
CREAT SERPL-MCNC: 0.89 MG/DL (ref 0.7–1.2)
CRYSTALS, UA: ABNORMAL /HPF
CULTURE: NO GROWTH
CULTURE: NORMAL
DIFFERENTIAL TYPE: ABNORMAL
EKG ATRIAL RATE: 76 BPM
EKG ATRIAL RATE: 77 BPM
EKG P AXIS: 74 DEGREES
EKG P AXIS: 78 DEGREES
EKG P-R INTERVAL: 162 MS
EKG P-R INTERVAL: 172 MS
EKG Q-T INTERVAL: 390 MS
EKG Q-T INTERVAL: 408 MS
EKG QRS DURATION: 90 MS
EKG QRS DURATION: 92 MS
EKG QTC CALCULATION (BAZETT): 438 MS
EKG QTC CALCULATION (BAZETT): 461 MS
EKG R AXIS: 29 DEGREES
EKG R AXIS: 40 DEGREES
EKG T AXIS: 44 DEGREES
EKG T AXIS: 53 DEGREES
EKG VENTRICULAR RATE: 76 BPM
EKG VENTRICULAR RATE: 77 BPM
EOSINOPHILS RELATIVE PERCENT: 3 %
EPITHELIAL CELLS UA: ABNORMAL /HPF (ref 0–5)
GFR AFRICAN AMERICAN: >60 ML/MIN
GFR NON-AFRICAN AMERICAN: >60 ML/MIN
GFR SERPL CREATININE-BSD FRML MDRD: ABNORMAL ML/MIN/{1.73_M2}
GFR SERPL CREATININE-BSD FRML MDRD: ABNORMAL ML/MIN/{1.73_M2}
GLUCOSE BLD-MCNC: 72 MG/DL (ref 70–99)
GLUCOSE URINE: NEGATIVE
HCT VFR BLD CALC: 36 % (ref 41–53)
HEMOGLOBIN: 12.2 G/DL (ref 13.5–17.5)
HIV AG/AB: NONREACTIVE
IMMATURE GRANULOCYTES: ABNORMAL %
KETONES, URINE: NEGATIVE
LEUKOCYTE ESTERASE, URINE: NEGATIVE
LYMPHOCYTES # BLD: 24 %
Lab: NORMAL
MCH RBC QN AUTO: 32.5 PG (ref 26–34)
MCHC RBC AUTO-ENTMCNC: 33.8 G/DL (ref 31–37)
MCV RBC AUTO: 96.2 FL (ref 80–100)
MDMA URINE: NORMAL
METHADONE SCREEN, URINE: NEGATIVE
METHAMPHETAMINE, URINE: NORMAL
MONOCYTES # BLD: 10 %
MUCUS: ABNORMAL
NITRITE, URINE: NEGATIVE
OPIATES, URINE: NEGATIVE
OTHER OBSERVATIONS UA: ABNORMAL
OXYCODONE SCREEN URINE: NEGATIVE
PDW BLD-RTO: 13.2 % (ref 12.5–15.4)
PH UA: 5.5 (ref 5–8)
PHENCYCLIDINE, URINE: NEGATIVE
PLATELET # BLD: 174 K/UL (ref 140–450)
PLATELET ESTIMATE: ABNORMAL
PMV BLD AUTO: 7.9 FL (ref 6–12)
POTASSIUM SERPL-SCNC: 4.1 MMOL/L (ref 3.7–5.3)
PROPOXYPHENE, URINE: NORMAL
PROTEIN UA: NEGATIVE
RBC # BLD: 3.75 M/UL (ref 4.5–5.9)
RBC # BLD: ABNORMAL 10*6/UL
RBC UA: ABNORMAL /HPF (ref 0–2)
RENAL EPITHELIAL, UA: ABNORMAL /HPF
SEG NEUTROPHILS: 62 %
SEGMENTED NEUTROPHILS ABSOLUTE COUNT: 3.7 K/UL (ref 1.8–7.7)
SODIUM BLD-SCNC: 137 MMOL/L (ref 135–144)
SPECIFIC GRAVITY UA: <1.005 (ref 1–1.03)
SPECIMEN DESCRIPTION: NORMAL
STATUS: NORMAL
TEST INFORMATION: NORMAL
TRICHOMONAS: ABNORMAL
TRICYCLIC ANTIDEPRESSANTS, UR: NORMAL
TURBIDITY: CLEAR
URINE HGB: NEGATIVE
UROBILINOGEN, URINE: NORMAL
WBC # BLD: 5.9 K/UL (ref 3.5–11)
WBC # BLD: ABNORMAL 10*3/UL
WBC UA: ABNORMAL /HPF (ref 0–5)
YEAST: ABNORMAL

## 2017-10-19 PROCEDURE — G8987 SELF CARE CURRENT STATUS: HCPCS

## 2017-10-19 PROCEDURE — 99223 1ST HOSP IP/OBS HIGH 75: CPT | Performed by: INTERNAL MEDICINE

## 2017-10-19 PROCEDURE — 6360000002 HC RX W HCPCS: Performed by: STUDENT IN AN ORGANIZED HEALTH CARE EDUCATION/TRAINING PROGRAM

## 2017-10-19 PROCEDURE — 2500000003 HC RX 250 WO HCPCS: Performed by: STUDENT IN AN ORGANIZED HEALTH CARE EDUCATION/TRAINING PROGRAM

## 2017-10-19 PROCEDURE — G8988 SELF CARE GOAL STATUS: HCPCS

## 2017-10-19 PROCEDURE — G8989 SELF CARE D/C STATUS: HCPCS

## 2017-10-19 PROCEDURE — 80048 BASIC METABOLIC PNL TOTAL CA: CPT

## 2017-10-19 PROCEDURE — 36415 COLL VENOUS BLD VENIPUNCTURE: CPT

## 2017-10-19 PROCEDURE — 2580000003 HC RX 258: Performed by: STUDENT IN AN ORGANIZED HEALTH CARE EDUCATION/TRAINING PROGRAM

## 2017-10-19 PROCEDURE — 85025 COMPLETE CBC W/AUTO DIFF WBC: CPT

## 2017-10-19 PROCEDURE — 97165 OT EVAL LOW COMPLEX 30 MIN: CPT

## 2017-10-19 RX ADMIN — SODIUM CHLORIDE: 9 INJECTION, SOLUTION INTRAVENOUS at 14:03

## 2017-10-19 RX ADMIN — Medication 10 ML: at 10:34

## 2017-10-19 RX ADMIN — SODIUM CHLORIDE: 9 INJECTION, SOLUTION INTRAVENOUS at 08:56

## 2017-10-19 RX ADMIN — FOLIC ACID: 5 INJECTION, SOLUTION INTRAMUSCULAR; INTRAVENOUS; SUBCUTANEOUS at 08:57

## 2017-10-19 RX ADMIN — CIPROFLOXACIN 400 MG: 2 INJECTION, SOLUTION INTRAVENOUS at 10:28

## 2017-10-19 RX ADMIN — ENOXAPARIN SODIUM 40 MG: 40 INJECTION SUBCUTANEOUS at 09:00

## 2017-10-19 ASSESSMENT — PAIN SCALES - GENERAL
PAINLEVEL_OUTOF10: 0
PAINLEVEL_OUTOF10: 0

## 2017-10-19 NOTE — CONSULTS
TR[facility]MA Consult Note  (V 2.0)    PATIENT NAME: Marine Najjar  YOB: 1950  MEDICAL RECORD NO. [id number]   DATE: 10/19/2017  PRIMARY CARE PHYSICIAN: Sarthak Allison MD  PATIENT EVALUATED AT THE REQUEST OF :   Jesika Albarran    ACTIVATION   Cervical spine clearance    IMPRESSION:     Patient Active Problem List   Diagnosis    Idiopathic hypotension    Encephalopathy, toxic    Substance intoxication without complication ([facility]    HENOK (acute kidney injury) ([facility]    Alcoholic intoxication without complication ([facility]     · Need for Cervical spine clearance    MEDICAL DECISION MAKING AND PLAN:     · The patient is awake and alert. The patient had a CT cervical spine which was negative. His collar was removed and he had no tenderness on palpation of his cervical spine. He had full ROM without tenderness and meets Nexus criteria for having his cervical spine cleared. The patient was log rolled with no midline tenderness of exam.    CTLS is cleared    CONSULT SERVICES    [] Neurosurgery     [] Orthopedic Surgery    [] Cardiothoracic     [] Facial Trauma    [] Plastic Surgery (Burn)    [] Pediatric Surgery     [x] Internal Medicine    [] Pulmonary Medicine    [] Other:        HISTORY:     SOURCE OF INFORMATION  Patient information was obtained from patient. History/Exam limitations: none. INJURY SUMMARY    None    GENERAL DATA  Age 78 y.o.  male   Patient information was obtained from patient. History/Exam limitations: none.   Patient presented to the Emergency Department by ambulance where the patient received cervical collar prior to arrival.  Injury Date: 10/19/2017   Approximate Injury Time: unknown      Transport mode:   [x]Ambulance      [] Helicopter     []Car       [] Other  Referring Hospital: [address] [place] [address], (e.g., home, farm, industry, street)  Specific Details of Location (e.g., bedroom, kitchen, garage): street    MECHANISM OF INJURY    [x]Other _________Found down_____________________________________________      HISTORY: Pt is a 79 yo M who was found down at a bus stop. He was brought to the ED who placed a cervical collar. He responded to Narcan and has since improved. He's now awake and alert. Loss of Consciousness []No   [x]Yes Duration(min)    Total Fluids Given Prior To Arrival  cc    MEDICATIONS:   []  None     []  Information not available due to exam limitations documented above  Prior to Admission medications    Medication Sig Start Date End Date Taking? Authorizing Provider   ciprofloxacin (CIPRO) 500 MG tablet Take 1 tablet by mouth 2 times daily for 7 days 10/16/17 10/23/17  Chioma Alexander MD       ALLERGIES:   []  None    []   Information not available due to exam limitations documented above   Review of patient's allergies indicates no known allergies. PAST MEDICAL HISTORY: []  None   []   Information not available due to exam limitations documented above    has a past medical history of Arthritis; COPD (chronic obstructive pulmonary disease) ([facility] Hypertension; and Substance abuse.  has a past surgical history that includes orthopedic surgery (Left). FAMILY HISTORY   []   Information not available due to exam limitations documented above    family history includes Heart Disease in his sister; Kidney Disease in his sister; Other in his brother, mother, and sister. SOCIAL HISTORY  []   Information not available due to exam limitations documented above     reports that he has been smoking Cigarettes. He has a 25.00 pack-year smoking history. He has never used smokeless tobacco.   reports that he drinks about 3.6 oz of alcohol per week . reports that he uses drugs, including Cocaine and Marijuana. PERTINENT SYSTEMIC REVIEW:  []   Information not available due to exam limitations documented above  A comprehensive review of systems was negative.       PHYSICAL EXAMINATION:   GLASCOW COMA SCALE  NEUROMUSCULAR BLOCKADE PRIOR TO ARRIVAL _____________________    []LLE  []Normal   _____________________  []OTHER []Normal   _____________________    CT SCANS  Ordered  [x]HEAD  []Normal   [] Preliminary findings   []CHEST  []Normal   [] Preliminary findings  []ABD/PELVIS[]Normal  [] Preliminary findings  []FACE []Normal   [] Preliminary findings:   [x]C-SPINE  []Normal   [] Preliminary findings  []T-SPINE  []Normal   [] Preliminary findings  []L-SPINE  []Normal   [] Preliminary findings    []ANGIOGRAPHY  []Normal     [] Preliminary findings  []OTHER   []Normal     [] Preliminary findings:     LABS  Labs Reviewed   CBC WITH AUTO DIFFERENTIAL - Abnormal; Notable for the following:        Result Value    RBC 3.55 (*)     Hemoglobin 11.5 (*)     Hematocrit 34.3 (*)     Segs Absolute 1.60 (*)     All other components within normal limits   BASIC METABOLIC PANEL - Abnormal; Notable for the following:     Glucose 149 (*)     CREATININE 1.24 (*)     Calcium 7.7 (*)     Potassium 3.5 (*)     CO2 17 (*)     GFR Non- 58 (*)     All other components within normal limits   TOX SCR, BLD, ED - Abnormal; Notable for the following:     Ethanol 188 (*)     Salicylate Lvl <1 (*)     Acetaminophen Level <10 (*)     All other components within normal limits   PROTIME-INR - Abnormal; Notable for the following:     Protime 13.8 (*)     All other components within normal limits   HEPATIC FUNCTION PANEL - Abnormal; Notable for the following:     Alb 3.2 (*)     Total Protein 6.1 (*)     All other components within normal limits   HGB/HCT - Abnormal; Notable for the following:     POC Hemoglobin 11.7 (*)     POC Hematocrit 34 (*)     All other components within normal limits   POTASSIUM (POC) - Abnormal; Notable for the following:     POC Potassium 3.4 (*)     All other components within normal limits   CHLORIDE (POC) - Abnormal; Notable for the following:     POC Chloride 111 (*)     All other components within normal limits   CALCIUM, IONIC (POC) - Abnormal; recommendations with Resident, GCS RN, bedside nurse. Pt doing well, no midling TTP. CT reviewed. NO pain\paresthiea with ROM.        Jose Antonio Marinelli DO  10/19/2017  6:26 PM

## 2017-10-19 NOTE — PROGRESS NOTES
Senior note     Chief complaint/reason for consultation:   Found down in altered mental state after ingesting K2    History of Present Illness:     Lucio Stovall is a 80-year-old male   PMH ArthritiS, COPD (chronic obstructive pulmonary disease), Hypertension and polysubstance abuse presenting with acute change in mental status. Patient was found down at the bus stop by bystanders after ingesting K2. He was initially somnolent, given Narcan without improvement in mental status. He arrived in the ED hypotensive requiring 3L of fluid after which BP normalized. Denies had strike    He was recently [facility] emergency room 4 days ago for diarrhea and abdominal pain after ingesting salad and chicken. He was also diagnosed with acute cystitis and HENOK. He was sent home on Bactrim which his PCP changed to Ciprofloxacin. Review of [facility] labs showed Cryptosporidium in stool, C-Diff negative. He denies urinary frequency or hematuria. He has taken 3 doses of Bactrim so far. He continues to drink at least 3-4 beers daily. He denies any worsening of shortness of breath or cough.        CT head and cervical spine negative  Admitted for acute toxic encephalopathy    Physical Examination :   Patient Vitals for the past 8 hrs:   BP Temp Temp src Pulse Resp SpO2 Height Weight   10/18/17 2100 - - - - - - 6' 1\" (1.854 m) 160 lb (72.6 kg)   10/18/17 2052 (!) 145/98 97.1 °F (36.2 °C) Oral 84 11 98 % - -   10/18/17 2026 111/76 - - 89 18 94 % - -   10/18/17 1849 (!) 102/57 - - 88 22 96 % - -   10/18/17 1828 (!) 86/47 - - 82 22 98 % - -   10/18/17 1758 (!) 77/46 - - 77 22 97 % - -   10/18/17 1742 (!) 69/41 - - - - - - -   10/18/17 1735 - 95.5 °F (35.3 °C) Axillary 76 22 96 % - 161 lb (73 kg)       Impression :     Principal Problem:    Encephalopathy, toxic    Active Problems:    Substance intoxication without complication (HCC)    HENOK (acute kidney injury) ([facility]    Alcoholic intoxication without complication [facility])    Idiopathic hypotension    Medical Decision Making:     Admits to medsurg  Due to history of alcohol use and presents of alcohol in blood will anticipate withdrawal and add ativan for agitation  C-Spine pending, to be cleared in am  Continue Cipro for acute gastroenteritis   Normal saline at 125ml/hr for for HENOK  Banana bag for vitamin replacement  Replace lytes  Follow urine culture/ua/UDS  Keep NPO for now and po when tolerating and c-spine cleared    Malik Rabago MD PGY 2  Internal Medicine Resident   [place]uc

## 2017-10-19 NOTE — H&P
Internal Medicine         History and Physical Examination         NAME:  Sivan Rice  MRN:  [id number]  Acct: [de-identified]   : 1950  Víctor Moore MD  Admit date:10/18/2017  History Obtained From:  Patient     CHIEF COMPLAINT:  \"AMS\"    HISTORY OF PRESENT ILLNESS:    The patient is a 78 y.o. male with significant past medical history of COPD, smoker presented after being found down at the bus stop by bystanders. He was initially somnolent, given Narcan without improvement in mental status. He arrived in the ED hypotensive requiring 3L of fluid after which BP normalized. He denies hitting his head. He denies losing consciousness, or control of his bowel or bladder.     He was recently [facility] emergency room for 4 day history diarrhea and abdominal pain after ingesting salad and chicken. He was also diagnosed with acute cystitis and HENOK. He was sent home on Bactrim which his PCP changed to Ciprofloxacin. Review of [facility] labs showed Cryptosporidium in stool, C-Diff negative. He denies urinary frequency or hematuria. In ED CT head and cervical spine negative. His ethanol levels in ED were 185, he admitted to smoking K 2. REVIEW OF SYSTEMS:  Review of Systems   Constitutional: Negative. HENT: Negative. Eyes: Negative. Respiratory: Negative. Cardiovascular: Negative. Gastrointestinal: Negative. Genitourinary: Negative. Musculoskeletal: Negative. Skin: Negative. Neurological: Negative. Endo/Heme/Allergies: Negative. Psychiatric/Behavioral: Negative. PAST MEDICAL HISTORY:  Past Medical History:   Diagnosis Date    Arthritis     COPD [facility]chronic obstructive pulmonary disease) ([facility] [address]     Hypertension     Substance abuse     beer, cocaine       PAST SURGICAL HISTORY:   Past Surgical History:   Procedure Laterality Date    ORTHOPEDIC SURGERY Left     Pins in left elbow.        MEDICATIONS PRIOR TO ADMISSION:  Prescriptions Prior to Admission: ciprofloxacin (CIPRO) 500 MG tablet, Take 1 tablet by mouth 2 times daily for 7 days    ALLERGIES:  Review of patient's allergies indicates no known allergies. SOCIAL HISTORY:  Social History     Social History    Marital status:      Spouse name: N/A    Number of children: N/A    Years of education: N/A     Social History Main Topics    Smoking status: Current Every Day Smoker     Packs/day: 0.50     Years: 50.00     Types: Cigarettes    Smokeless tobacco: Never Used    Alcohol use 3.6 oz/week     6 Cans of beer per week      Comment: 6 beers daily    Drug use:      Types: Cocaine, Marijuana      Comment: Pt. states hasn't used for last 6 months    Sexual activity: Not Asked     Other Topics Concern    None     Social History Narrative    None       FAMILY HISTORY:  Family History   Problem Relation Age of Onset    Other Mother      homicide    Heart Disease Sister     Kidney Disease Sister     Other Sister      drug abuse    Other Brother      ivda       PHYSICAL EXAM:  BP (!) 145/98   Pulse 84   Temp 97.1 °F (36.2 °C) (Oral)   Resp 11   Ht 6' 1\" (1.854 m)   Wt 160 lb (72.6 kg)   SpO2 98%   BMI 21.11 kg/m²   Physical Exam   Constitutional: He is oriented to person, place, and time. He appears well-developed and well-nourished. HENT:   Head: Normocephalic and atraumatic. Eyes: Pupils are equal, round, and reactive to light. Neck: Normal range of motion. Cardiovascular: Normal rate and regular rhythm. Pulmonary/Chest: Effort normal.   Abdominal: Bowel sounds are normal.   Musculoskeletal: Normal range of motion. Neurological: He is alert and oriented to person, place, and time. Gargled speech   Skin: Skin is warm and dry.        DATA:  CBC:   Lab Results   Component Value Date    WBC 4.7 10/18/2017    RBC 3.55 10/18/2017    RBC 4.00 03/14/2012    HGB 11.5 10/18/2017    HCT 34.3 10/18/2017    MCV 96.8 10/18/2017    MCH 32.5 10/18/2017    MCHC 33.5 10/18/2017 RDW 13.3 10/18/2017     10/18/2017     03/14/2012    MPV 7.9 10/18/2017     BMP:   Lab Results   Component Value Date     10/18/2017    K 3.5 10/18/2017     10/18/2017    CO2 17 10/18/2017    BUN 9 10/18/2017    LABALBU 3.2 10/18/2017    CREATININE 1.24 10/18/2017    CALCIUM 7.7 10/18/2017    GFRAA >60 10/18/2017    LABGLOM 58 10/18/2017    GLUCOSE 149 10/18/2017    GLUCOSE 81 03/14/2012     Hepatic Function Panel:    Lab Results   Component Value Date    ALKPHOS 68 10/18/2017    ALT 12 10/18/2017    AST 25 10/18/2017    PROT 6.1 10/18/2017    BILITOT 0.52 10/18/2017    BILIDIR 0.17 10/18/2017    IBILI 0.35 10/18/2017    LABALBU 3.2 10/18/2017         Principal Problem:    Encephalopathy, toxic  Active Problems:    Idiopathic hypotension    Substance intoxication without complication (HCC)    HENOK (acute kidney injury) ([facility] [address]    Alcoholic intoxication without complication (HCC)      ASSESSMENT/PLAN:   Will keep patient NPO  Will give IV Folic acid, MVI and thiamine  IV  ml/hr  Will await UA/ Urine Cx  Ciprofloxacin 400 mg BID  Ativan PRN for agitation/withdrawal  CT head and CT cervical spine negative  On C collar per trauma  Lovenox for DVT prophylaxis      Madison Cameron MD      Department of Internal Medicine  [place], [place]         10/18/2017, 10:06 PM

## 2017-10-19 NOTE — PROGRESS NOTES
Occupational Therapy   Occupational Therapy Initial Assessment  Date: 10/19/2017   Patient Name: Altagracia Avilez  MRN: [id number]     : 1950    Patient Diagnosis(es): The primary encounter diagnosis was Altered mental status, unspecified altered mental status type. Diagnoses of Acute alcoholic intoxication with complication (HCC) and Elevated lactic acid level were also pertinent to this visit. has a past medical history of Arthritis; COPD (chronic obstructive pulmonary disease) ([address] Hypertension; and Substance abuse.   has a past surgical history that includes orthopedic surgery (Left).     Treatment Diagnosis: AMS      Restrictions  Restrictions/Precautions  Restrictions/Precautions: General Precautions  Required Braces or Orthoses?: No  Position Activity Restriction  Spinal Precautions: CTLS cleared 10/19    Subjective   Pain Assessment  Patient Currently in Pain: No  Pain Assessment: 0-10  Pain Level: 0  Oxygen Therapy  SpO2: 97 %  O2 Device: None (Room air)  Social/Functional History  Social/Functional History  Lives With: Alone  Type of Home: Apartment  Home Layout: Multi-level  Home Access: Stairs to enter with rails  ADL Assistance: Independent  Homemaking Assistance: Independent  Homemaking Responsibilities: Yes  Active : No  Mode of Transportation: Bus  Occupation: Retired  Leisure & Hobbies: mowing grass       Objective   Vision: Within Functional Limits  Hearing: Within functional limits    Orientation  Overall Orientation Status: Within Functional Limits  Observation/Palpation  Posture: Good  Balance  Sitting Balance: Independent  Standing Balance: Modified independent   Standing Balance  Sit to stand: Modified independent  ADL  Feeding: Independent  Grooming: Independent  UE Bathing: Independent  LE Bathing: Independent  UE Dressing: Independent  LE Dressing: Independent  Toileting: Independent     Bed mobility  Bridging: Independent  Rolling to Left: Independent  Rolling to Right: Independent  Supine to Sit: Independent  Sit to Supine: Independent  Scooting: Independent  Transfers  Sit to stand: Modified independent  Vision - Basic Assessment  Prior Vision: Wears glasses all the time  Cognition  Overall Cognitive Status: WFL     Sensation  Overall Sensation Status: WFL      LUE AROM (degrees)  LUE AROM : WFL  RUE AROM (degrees)  RUE AROM : WFL  LUE Strength  Gross LUE Strength: WFL  RUE Strength  Gross RUE Strength: WFL     Assessment   Treatment Diagnosis: AMS  Prognosis: Good  Decision Making: Low Complexity  Discharge Recommendations: Home independently  No Skilled OT: Independent with ADL's;At baseline function  REQUIRES OT FOLLOW UP: No  Activity Tolerance  Activity Tolerance: Patient Tolerated treatment well  Safety Devices  Safety Devices in place: Yes  Type of devices: Gait belt;Nurse notified; Left in bed        Discharge Recommendations:  Home independently     Plan   Plan  Plan Comment: Patient reports baseline and demo safe ability to return home    G-Code  OT G-codes  Functional Assessment Tool Used: [place] AM-PAC  Score: 24/24  Functional Limitation: Self care  Self Care Current Status (): 0 percent impaired, limited or restricted  Self Care Goal Status (): 0 percent impaired, limited or restricted  Self Care Discharge Status (): 0 percent impaired, limited or restricted      Therapy Time   Individual Concurrent Group Co-treatment   Time In 1545         Time Out 1600         Minutes 15               Patient supine in bed upon arrival.  Patient agreeable to OT eval this date, reports wanted to leave to catch bus before it gets dark. Patient je pain this date. Patient demo bed mobility independently, WFL UE strength and balance. Patient performs LB donning of socks independently, patient reports at baseline and demo independence with ADLs this date. No skilled OT goals identified.    Dorothy Hedrick, OTR/L

## 2017-10-19 NOTE — PROGRESS NOTES
Physical Therapy  DATE: 10/19/2017    NAME: Ck Harp  MRN: [id number]   : 1950    Patient not seen this date for Physical Therapy due to:  [] Blood transfusion in progress  [] Hemodialysis  []  Patient Declined  [] Spine Precautions   [x] Strict Bedrest  [] Surgery/ Procedure  [] Testing      [] Other        [] PT being discontinued at this time. Patient independent. No further needs. [] PT being discontinued at this time as the patient has been transferred to palliative care. No further needs. Marylene Pound, PRISCA  Evaluation/treatment performed by Student PT under the supervision of co-signing PT who agrees with all evaluation/treatment and documentation.

## 2017-10-19 NOTE — PROGRESS NOTES
Writer called into room, patient wants to remove collar, states he wants to leave the floor to eat because hes hungry. Patient got up to go to the restroom despite bedrest. Trauma service perfect served. Will continue to monitor.

## 2017-10-19 NOTE — PROGRESS NOTES
seen and examined the patient and the key elements of all parts of the encounter have been performed by me. I agree with the assessment, plan and orders as documented by the resident.      Jason Stallings MD  10/20/2017  10:59 AM

## 2017-10-20 ENCOUNTER — CARE COORDINATION (OUTPATIENT)
Dept: CASE MANAGEMENT | Age: 67
End: 2017-10-20

## 2017-10-20 ENCOUNTER — TELEPHONE (OUTPATIENT)
Dept: PHARMACY | Age: 67
End: 2017-10-20

## 2017-10-20 ENCOUNTER — CARE COORDINATION (OUTPATIENT)
Dept: CARE COORDINATION | Age: 67
End: 2017-10-20

## 2017-10-20 PROBLEM — A07.2 CRYPTOSPORIDIOSIS (HCC): Status: ACTIVE | Noted: 2017-10-20

## 2017-10-20 NOTE — TELEPHONE ENCOUNTER
CLINICAL PHARMACY NOTE  Post-Discharge Transitions of Care [facility])    Patient discharged from [facility] on 10/19/2017. Attempt made to reach patient by telephone for medication review. Person who answered call said he was not the patient and I had the wrong number. No further attempts will be made at this time. Unable to reach letter prepared and will be mailed to patient. [facility]  Medication Management   [phone]    I have discussed the care of this patient with the student. I agree with the assessment and plan as documented by the student. Kisha Mejias, Pharm. D., [address] [facility] Medication Management Service  [phone]  10/20/2017  11:29 AM                  CLINICAL PHARMACY NOTE   POST-DISCHARGE TELEPHONE FOLLOW-UP ADDENDUM    For Pharmacy Admin Tracking Only    TCM Call Made?: Yes  [facility] ([facility] Select Patient?: Yes  Total # of Interventions Recommended: 0  Total # Interventions Accepted: 0  Outreach Status: Patient Unreachable  Care Coordinator Outreach to Patient?: Yes  Provider Contacted?: No  Time Spent (min): 5    Additional Documentation:

## 2017-10-20 NOTE — LETTER
[facility] Medication Management  [address] [place] [address]  Phone: [phone]    King Carl., BCACP  Clinical Pharmacist  Medication Management Service        October 20, 2017    Rudy Mcgurie  [address] [facility] [address]      Dear Hans Law:    I recently tried to reach you by telephone to review your medications after your hospital stay. I know that medications can be confusing after a hospital stay. If you are interested in having a pharmacist review your medications or have questions regarding your medications, please call [phone]. If I do not hear from you after 2 weeks, I will assume that you do not have any medication questions or concerns. If you have any questions or concerns, please don't hesitate to call. Sincerely,        King Carl., SERGEYCP  bowman/ams

## 2017-10-22 ASSESSMENT — ENCOUNTER SYMPTOMS
NAUSEA: 0
BLOOD IN STOOL: 0
DIARRHEA: 1
BACK PAIN: 0
SHORTNESS OF BREATH: 0
BLURRED VISION: 0
SPUTUM PRODUCTION: 0
EYE REDNESS: 0
ABDOMINAL PAIN: 1
HEARTBURN: 0
COUGH: 0
CONSTIPATION: 0

## 2017-10-24 ENCOUNTER — CARE COORDINATION (OUTPATIENT)
Dept: CASE MANAGEMENT | Age: 67
End: 2017-10-24

## 2017-10-24 LAB
CULTURE: NORMAL
Lab: NORMAL
Lab: NORMAL
SPECIMEN DESCRIPTION: NORMAL
SPECIMEN DESCRIPTION: NORMAL
STATUS: NORMAL
STATUS: NORMAL

## 2017-10-24 NOTE — CARE COORDINATION
[facility] Transitions Follow Up Call  10/24/2017    Patient: Sherlyn Frost  Patient : 1950   MRN: [id number]    Reason for Admission: encephalopathy, ETOH intoxication  Discharge Date: 10/19/17   RARS: Risk Score: 10.5, CM 5     Spoke with: patient    Care Transitions Subsequent and Final Call    Schedule Follow Up Appointment with PCP:  Completed  Subsequent and Final Calls  Do you have any ongoing symptoms?:  No  Have your medications changed?:  No  Do you have any questions related to your medications?:  No  Do you currently have any active services?:  Yes  Are you currently active with any services?:  Other  Do you have any needs or concerns that I can assist you with?:  No  Identified Barriers:  Lack of Education  Care Transitions Interventions  Other Interventions:          Spoke with patient. Denies any symptoms. No further diarrhea. \"Stomach is fine\"  He still plans to have lab work drawn next week & then see Dr. Crespo on  as previously scheduled. He does not want to move appointment up any sooner. We have talked about this before & he is rigid with his schedule so he doesn't forget. [facility] nurse saw patient today. Care Transition will continue to follow.     Follow Up:   Future Appointments  Date Time Provider Blake Delacruz   2017 9:30 AM Miles Garcia MD [facility] GERALD Celaya RN

## 2017-10-25 ENCOUNTER — TELEPHONE (OUTPATIENT)
Dept: PHARMACY | Age: 67
End: 2017-10-25

## 2017-10-28 NOTE — DISCHARGE SUMMARY
[address] [place] [address]     Department of Internal Medicine - Internal Medicine III    DISCHARGE SUMMARY      PATIENT IDENTIFICATION:  NAME:  Bernadette Coates   :   1950  MRN:    [id number]     Acct:    [de-identified]   Admit Date:  10/18/2017  Discharge date:  10/19/2017  5:36 PM   Attending Provider: No att. providers found                                     DIAGNOSES:  Principal Problem:    Encephalopathy, toxic secondary to alcohol abuse  Active Problems:    Idiopathic hypotension    Substance intoxication without complication ([facility] [address]    HENOK (acute kidney injury) ([facility] [address]    Alcoholic intoxication without complication ([facility] [address]    Altered mental status    Gastroenteritis with Cryptosporidiosis       HOSPITAL COURSE:   78 y.o. male with significant past medical history of COPD, smoker presented after being found down at the bus stop by bystanders. He was initially somnolent, given Narcan without improvement in mental status.  He arrived in the ED hypotensive requiring 3L of fluid after which BP normalized. He was recently [facility] emergency room for 4 day history diarrhea and abdominal pain after ingesting salad and chicken. In ED CT head and cervical spine negative. His ethanol levels in ED were 185, he admitted to smoking K 2. He decided to leave [place], before being cleared.       CONSULTS:  trauma    PROCEDURES:    none    HOSPITAL ACQUIRED INFECTION:  none    PATIENT'S DISCHARGE CONDITION:    AMA  PATIENT/FAMILY INSTRUCTIONS:   F/u with PCP       Medication List      ASK your doctor about these medications    ciprofloxacin 500 MG tablet  Commonly known as:  CIPRO  Take 1 tablet by mouth 2 times daily for 7 days  Ask about: Should I take this medication?              ACTIVITY AFTER DISCHARGE:   activity as tolerated    Diet: regular diet    Disposition: AMA    Follow-up:  in the next few days with Murphy Garcia MD,    Time Spent on discharge is more than 20 minutes in the examination, evaluation, counseling and review of medications and discharge plan.     Shellie Jones MD   10/28/2017

## 2017-10-30 ENCOUNTER — CARE COORDINATION (OUTPATIENT)
Dept: CASE MANAGEMENT | Age: 67
End: 2017-10-30

## 2017-11-06 ENCOUNTER — CARE COORDINATION (OUTPATIENT)
Dept: CASE MANAGEMENT | Age: 67
End: 2017-11-06

## 2017-11-06 NOTE — CARE COORDINATION
[facility] Transitions Final Call  2017    Patient: Roxanne Riedel  Patient : 1950   MRN: [id number]    Reason for Admission:ETOH intox, encephalopathy  Discharge Date: 10/19/17 RARS: Risk Score: 10.5, 5    Spoke with: patient    Care Transitions Subsequent and Final Call    Schedule Follow Up Appointment with PCP:  Completed  Subsequent and Final Calls  Do you have any ongoing symptoms?:  No  Have your medications changed?:  No  Do you have any questions related to your medications?:  No  Do you currently have any active services?:  Yes  Are you currently active with any services?:  Other  Do you have any needs or concerns that I can assist you with?:  No  Identified Barriers:  Impairment, Lack of Education  Care Transitions Interventions  Other Interventions:          Patient denies any problems- planning to see Dr. Sadie Saavedra on  as scheduled - he did not want to move appt up closer to discharge on 10/19. Denies any more diarrhea. He travels by bus to [facility]. Last transitional call. Transitional episode resolved.   Has seen [facility]    Follow Up: ,   Future Appointments  Date Time Provider Blake Delacruz   2017 9:30 AM Burton Rivera MD [facility]   2017 2:15 PM [facility]     Frank Vaughan RN

## 2017-11-08 ENCOUNTER — HOSPITAL ENCOUNTER (OUTPATIENT)
Age: 67
Discharge: HOME | End: 2017-11-08
Payer: MEDICARE

## 2017-11-08 ENCOUNTER — HOSPITAL ENCOUNTER (OUTPATIENT)
Dept: GENERAL RADIOLOGY | Age: 67
Discharge: HOME | End: 2017-11-08
Payer: MEDICARE

## 2017-11-08 DIAGNOSIS — A09 DIARRHEA OF INFECTIOUS ORIGIN: ICD-10-CM

## 2017-11-08 DIAGNOSIS — M17.0 PRIMARY OSTEOARTHRITIS OF BOTH KNEES: ICD-10-CM

## 2017-11-08 DIAGNOSIS — N17.9 AKI (ACUTE KIDNEY INJURY) (HCC): ICD-10-CM

## 2017-11-08 DIAGNOSIS — N30.00 ACUTE CYSTITIS WITHOUT HEMATURIA: ICD-10-CM

## 2017-11-08 LAB
ANION GAP SERPL CALCULATED.3IONS-SCNC: 14 MMOL/L (ref 9–17)
BUN BLDV-MCNC: 9 MG/DL (ref 8–23)
BUN/CREAT BLD: NORMAL (ref 9–20)
CALCIUM SERPL-MCNC: 8.7 MG/DL (ref 8.6–10.4)
CHLORIDE BLD-SCNC: 100 MMOL/L (ref 98–107)
CO2: 25 MMOL/L (ref 20–31)
CREAT SERPL-MCNC: 0.93 MG/DL (ref 0.7–1.2)
GFR AFRICAN AMERICAN: >60 ML/MIN
GFR NON-AFRICAN AMERICAN: >60 ML/MIN
GFR SERPL CREATININE-BSD FRML MDRD: NORMAL ML/MIN/{1.73_M2}
GFR SERPL CREATININE-BSD FRML MDRD: NORMAL ML/MIN/{1.73_M2}
GLUCOSE BLD-MCNC: 83 MG/DL (ref 70–99)
POTASSIUM SERPL-SCNC: 3.9 MMOL/L (ref 3.7–5.3)
SODIUM BLD-SCNC: 139 MMOL/L (ref 135–144)
URIC ACID: 7.2 MG/DL (ref 3.4–7)

## 2017-11-08 PROCEDURE — 80048 BASIC METABOLIC PNL TOTAL CA: CPT

## 2017-11-08 PROCEDURE — 86403 PARTICLE AGGLUT ANTBDY SCRN: CPT

## 2017-11-08 PROCEDURE — 73562 X-RAY EXAM OF KNEE 3: CPT

## 2017-11-08 PROCEDURE — 84550 ASSAY OF BLOOD/URIC ACID: CPT

## 2017-11-08 PROCEDURE — 87186 SC STD MICRODIL/AGAR DIL: CPT

## 2017-11-08 PROCEDURE — 87086 URINE CULTURE/COLONY COUNT: CPT

## 2017-11-10 LAB
CULTURE: ABNORMAL
CULTURE: ABNORMAL
Lab: ABNORMAL
ORGANISM: ABNORMAL
SPECIMEN DESCRIPTION: ABNORMAL
STATUS: ABNORMAL

## 2017-11-13 ENCOUNTER — OFFICE VISIT (OUTPATIENT)
Dept: INTERNAL MEDICINE | Age: 67
End: 2017-11-13
Payer: MEDICARE

## 2017-11-13 ENCOUNTER — HOSPITAL ENCOUNTER (OUTPATIENT)
Age: 67
Setting detail: SPECIMEN
Discharge: HOME | End: 2017-11-13
Payer: MEDICARE

## 2017-11-13 VITALS
OXYGEN SATURATION: 100 % | HEART RATE: 77 BPM | SYSTOLIC BLOOD PRESSURE: 135 MMHG | DIASTOLIC BLOOD PRESSURE: 82 MMHG | BODY MASS INDEX: 21.47 KG/M2 | HEIGHT: 73 IN | WEIGHT: 162 LBS

## 2017-11-13 DIAGNOSIS — M17.0 PRIMARY OSTEOARTHRITIS OF BOTH KNEES: Primary | ICD-10-CM

## 2017-11-13 DIAGNOSIS — F10.20 ALCOHOLISM (HCC): ICD-10-CM

## 2017-11-13 DIAGNOSIS — D64.9 ANEMIA, UNSPECIFIED TYPE: ICD-10-CM

## 2017-11-13 DIAGNOSIS — N30.00 ACUTE CYSTITIS WITHOUT HEMATURIA: ICD-10-CM

## 2017-11-13 LAB
-: ABNORMAL
AMORPHOUS: ABNORMAL
BACTERIA: ABNORMAL
BILIRUBIN URINE: NEGATIVE
CASTS UA: ABNORMAL /LPF (ref 0–2)
COLOR: YELLOW
CRYSTALS, UA: ABNORMAL /HPF
EPITHELIAL CELLS UA: ABNORMAL /HPF (ref 0–5)
GLUCOSE URINE: NEGATIVE
KETONES, URINE: NEGATIVE
LEUKOCYTE ESTERASE, URINE: ABNORMAL
MUCUS: ABNORMAL
NITRITE, URINE: POSITIVE
OTHER OBSERVATIONS UA: ABNORMAL
PH UA: 8 (ref 5–8)
PROTEIN UA: NEGATIVE
RBC UA: ABNORMAL /HPF (ref 0–2)
RENAL EPITHELIAL, UA: ABNORMAL /HPF
SPECIFIC GRAVITY UA: 1.01 (ref 1–1.03)
TRICHOMONAS: ABNORMAL
TURBIDITY: CLEAR
URINE HGB: NEGATIVE
UROBILINOGEN, URINE: NORMAL
WBC UA: ABNORMAL /HPF (ref 0–5)
YEAST: ABNORMAL

## 2017-11-13 PROCEDURE — G8484 FLU IMMUNIZE NO ADMIN: HCPCS | Performed by: INTERNAL MEDICINE

## 2017-11-13 PROCEDURE — 1111F DSCHRG MED/CURRENT MED MERGE: CPT | Performed by: INTERNAL MEDICINE

## 2017-11-13 PROCEDURE — G8420 CALC BMI NORM PARAMETERS: HCPCS | Performed by: INTERNAL MEDICINE

## 2017-11-13 PROCEDURE — 4004F PT TOBACCO SCREEN RCVD TLK: CPT | Performed by: INTERNAL MEDICINE

## 2017-11-13 PROCEDURE — 3017F COLORECTAL CA SCREEN DOC REV: CPT | Performed by: INTERNAL MEDICINE

## 2017-11-13 PROCEDURE — 1123F ACP DISCUSS/DSCN MKR DOCD: CPT | Performed by: INTERNAL MEDICINE

## 2017-11-13 PROCEDURE — G8427 DOCREV CUR MEDS BY ELIG CLIN: HCPCS | Performed by: INTERNAL MEDICINE

## 2017-11-13 PROCEDURE — 99213 OFFICE O/P EST LOW 20 MIN: CPT

## 2017-11-13 PROCEDURE — 4040F PNEUMOC VAC/ADMIN/RCVD: CPT | Performed by: INTERNAL MEDICINE

## 2017-11-13 PROCEDURE — 99214 OFFICE O/P EST MOD 30 MIN: CPT | Performed by: INTERNAL MEDICINE

## 2017-11-13 NOTE — PROGRESS NOTES
[facility]    Progress Note    Date of patient's visit: 11/13/2017    Patient's Name:  Mahogany Reilly    YOB: 1950            Patient Care Team:  Chioma Alexander MD as PCP - General (Internal Medicine)  Chioma Alexander MD as PCP - S Attributed Provider    REASON FOR VISIT: Routine outpatient follow     Chief Complaint   Patient presents with    Follow-Up from Hospital     patient was seen 10/18/17 at [facility] he states that he smoked k2 with someone at a bus stop and it made im pass out no other problems since then    [facility] Maintenance     patient due for fit test         HISTORY OF PRESENT ILLNESS:    History was obtained from the patient. Mahogany Reilly is a 78 y.o. is here for Follow-up of his labs and tests. Last time he was seen he had been taking antibiotics for cystitis and for diarrhea. He was treated at [facility] ER with Bactrim. He was in [place] from dehydration. 2 days later he was in [place] admitted for syncope. He said he had been drinking alcohol and had smoked some marijuana. He was dehydrated. He was treated with hydration for a day and discharged home. Currently is not on any medications. He denies any complaints. He denies diarrhea or abdominal pain. He denies dysuria or hematuria. He denies urgency or frequency. He says he drinks a lot of water and 3 cans of beer daily so he does have to urinate frequently but denies dribbling or hesitancy. Last time he was here he was advised to get a blood and urine tests. He just did the urine culture recently and it is positive for MRSA. He is denying any symptoms. He has no fever or chills. He does have anemia. He has been referred to GI and he has an appointment on the 22nd. He knows he needs a colonoscopy done. He has chronic knee pain. He had x-rays done which shows moderate degenerative joint disease of both knees.   He denies any instability of motion, No tenderness, Supple, No stridor. Eyes:  PERRL, EOMI, Conjunctiva normal, No discharge. Respiratory:  Normal breath sounds, No respiratory distress, No wheezing, No chest tenderness. Cardiovascular:  Normal heart rate, Normal rhythm, No murmurs  GI:  Bowel sounds normal, Soft, No tenderness, No masses, . No CVA tenderness. Musculoskeletal:  Intact distal pulses, No edema, knee tenderness, no effusions. back- No tenderness. Integument:  Warm, Dry, No erythema, No rash. Lymphatic:  No lymphadenopathy noted. Neurologic:  Alert & oriented x 3, Normal motor function, Normal sensory function, No focal deficits noted. Psychiatric:  Affect normal    LABORATORY FINDINGS:    CBC:  Lab Results   Component Value Date    WBC 5.9 10/19/2017    HGB 12.2 10/19/2017     10/19/2017     03/14/2012     BMP:    Lab Results   Component Value Date     11/08/2017    K 3.9 11/08/2017     11/08/2017    CO2 25 11/08/2017    BUN 9 11/08/2017    CREATININE 0.93 11/08/2017    GLUCOSE 83 11/08/2017    GLUCOSE 81 03/14/2012     HEMOGLOBIN A1C:   Lab Results   Component Value Date    LABA1C 4.6 06/07/2016     MICROALBUMIN URINE: No results found for: MICROALBUR  FASTING LIPID PANEL:  Lab Results   Component Value Date    CHOL 190 11/12/2015    HDL 72 11/12/2015    TRIG 122 11/12/2015     Lab Results   Component Value Date    LDLCHOLESTEROL 94 11/12/2015       LIVER PROFILE:  Lab Results   Component Value Date    ALT 12 10/18/2017    AST 25 10/18/2017    PROT 6.1 10/18/2017    BILITOT 0.52 10/18/2017    BILIDIR 0.17 10/18/2017    LABALBU 3.2 10/18/2017      THYROID FUNCTION:   Lab Results   Component Value Date    TSH 3.34 11/12/2015      URINE ANALYSIS: No results found for: LABURIN  ASSESSMENT AND PLAN:    1. Primary osteoarthritis of both knees  Refuses ortho evaluation    - diclofenac sodium (VOLTAREN) 1 % GEL; Apply 4 g topically 4 times daily  Dispense: 5 Tube; Refill: 0    2.  Acute cystitis

## 2017-11-13 NOTE — PATIENT INSTRUCTIONS
-Follow-up appointment scheduled for 2/15/18, AVS given to patient.     It is very important that you keep your appointments, please contact us if you are unable to keep your scheduled appt     -Urine collected in office and sent to lab---[place]

## 2017-11-15 RX ORDER — NITROFURANTOIN 25; 75 MG/1; MG/1
100 CAPSULE ORAL 2 TIMES DAILY
Qty: 20 CAPSULE | Refills: 0 | Status: SHIPPED | OUTPATIENT
Start: 2017-11-15 | End: 2017-11-25

## 2017-11-20 ASSESSMENT — ENCOUNTER SYMPTOMS
BACK PAIN: 0
BLOOD IN STOOL: 0
CONSTIPATION: 0
WHEEZING: 0
SHORTNESS OF BREATH: 0
SINUS PAIN: 0
BLURRED VISION: 0
SPUTUM PRODUCTION: 0
EYE REDNESS: 0
ABDOMINAL PAIN: 0
COUGH: 0
NAUSEA: 0

## 2019-02-07 ENCOUNTER — TELEPHONE (OUTPATIENT)
Dept: INTERNAL MEDICINE | Age: 69
End: 2019-02-07

## 2019-09-22 ENCOUNTER — HOSPITAL ENCOUNTER (INPATIENT)
Age: 69
LOS: 1 days | Discharge: LEFT BEFORE DISCHARGE | DRG: 917 | End: 2019-09-23
Attending: EMERGENCY MEDICINE | Admitting: INTERNAL MEDICINE
Payer: MEDICARE

## 2019-09-22 ENCOUNTER — APPOINTMENT (OUTPATIENT)
Dept: GENERAL RADIOLOGY | Age: 69
DRG: 917 | End: 2019-09-22
Payer: MEDICARE

## 2019-09-22 ENCOUNTER — APPOINTMENT (OUTPATIENT)
Dept: CT IMAGING | Age: 69
DRG: 917 | End: 2019-09-22
Payer: MEDICARE

## 2019-09-22 DIAGNOSIS — R09.02 HYPOXIA: ICD-10-CM

## 2019-09-22 DIAGNOSIS — I95.9 HYPOTENSION, UNSPECIFIED HYPOTENSION TYPE: ICD-10-CM

## 2019-09-22 DIAGNOSIS — T50.901A ACCIDENTAL DRUG OVERDOSE, INITIAL ENCOUNTER: Primary | ICD-10-CM

## 2019-09-22 PROBLEM — J44.1 COPD EXACERBATION (HCC): Status: ACTIVE | Noted: 2019-09-22

## 2019-09-22 LAB
-: ABNORMAL
ABSOLUTE EOS #: 0.07 K/UL (ref 0–0.44)
ABSOLUTE IMMATURE GRANULOCYTE: 0.03 K/UL (ref 0–0.3)
ABSOLUTE LYMPH #: 1.64 K/UL (ref 1.1–3.7)
ABSOLUTE MONO #: 0.96 K/UL (ref 0.1–1.2)
ACETAMINOPHEN LEVEL: <5 UG/ML (ref 10–30)
ADENOVIRUS PCR: NOT DETECTED
ALBUMIN SERPL-MCNC: 3.7 G/DL (ref 3.5–5.2)
ALBUMIN/GLOBULIN RATIO: 1.1 (ref 1–2.5)
ALLEN TEST: ABNORMAL
ALP BLD-CCNC: 57 U/L (ref 40–129)
ALT SERPL-CCNC: 16 U/L (ref 5–41)
AMMONIA: 38 UMOL/L (ref 16–60)
AMORPHOUS: ABNORMAL
AMPHETAMINE SCREEN URINE: NEGATIVE
ANION GAP SERPL CALCULATED.3IONS-SCNC: 18 MMOL/L (ref 9–17)
ANION GAP: 11 MMOL/L (ref 7–16)
AST SERPL-CCNC: 26 U/L
BACTERIA: ABNORMAL
BARBITURATE SCREEN URINE: NEGATIVE
BASOPHILS # BLD: 1 % (ref 0–2)
BASOPHILS ABSOLUTE: 0.04 K/UL (ref 0–0.2)
BENZODIAZEPINE SCREEN, URINE: NEGATIVE
BILIRUB SERPL-MCNC: 0.44 MG/DL (ref 0.3–1.2)
BILIRUBIN URINE: NEGATIVE
BORDETELLA PERTUSSIS PCR: NOT DETECTED
BUN BLDV-MCNC: 16 MG/DL (ref 8–23)
BUN/CREAT BLD: ABNORMAL (ref 9–20)
BUPRENORPHINE URINE: NORMAL
CALCIUM SERPL-MCNC: 8.3 MG/DL (ref 8.6–10.4)
CANNABINOID SCREEN URINE: NEGATIVE
CASTS UA: ABNORMAL /LPF (ref 0–2)
CHLAMYDIA PNEUMONIAE BY PCR: NOT DETECTED
CHLORIDE BLD-SCNC: 99 MMOL/L (ref 98–107)
CO2: 20 MMOL/L (ref 20–31)
COCAINE METABOLITE, URINE: NEGATIVE
COLOR: YELLOW
CORONAVIRUS 229E PCR: NOT DETECTED
CORONAVIRUS HKU1 PCR: NOT DETECTED
CORONAVIRUS NL63 PCR: NOT DETECTED
CORONAVIRUS OC43 PCR: NOT DETECTED
CREAT SERPL-MCNC: 1.29 MG/DL (ref 0.7–1.2)
CRYSTALS, UA: ABNORMAL /HPF
DIFFERENTIAL TYPE: ABNORMAL
EOSINOPHILS RELATIVE PERCENT: 1 % (ref 1–4)
EPITHELIAL CELLS UA: ABNORMAL /HPF (ref 0–5)
ETHANOL PERCENT: 0.09 %
ETHANOL: 86 MG/DL
FIO2: ABNORMAL
GFR AFRICAN AMERICAN: >60 ML/MIN
GFR NON-AFRICAN AMERICAN: 52 ML/MIN
GFR NON-AFRICAN AMERICAN: 56 ML/MIN
GFR SERPL CREATININE-BSD FRML MDRD: >60 ML/MIN
GFR SERPL CREATININE-BSD FRML MDRD: ABNORMAL ML/MIN/{1.73_M2}
GLUCOSE BLD-MCNC: 108 MG/DL (ref 74–100)
GLUCOSE BLD-MCNC: 110 MG/DL (ref 70–99)
GLUCOSE URINE: NEGATIVE
HCO3 VENOUS: 21.6 MMOL/L (ref 22–29)
HCT VFR BLD CALC: 37.7 % (ref 40.7–50.3)
HEMOGLOBIN: 12.3 G/DL (ref 13–17)
HUMAN METAPNEUMOVIRUS PCR: NOT DETECTED
IMMATURE GRANULOCYTES: 0 %
INFLUENZA A BY PCR: NOT DETECTED
INFLUENZA A H1 (2009) PCR: ABNORMAL
INFLUENZA A H1 PCR: ABNORMAL
INFLUENZA A H3 PCR: ABNORMAL
INFLUENZA B BY PCR: NOT DETECTED
INR BLD: 1.3
KETONES, URINE: NEGATIVE
LACTIC ACID, WHOLE BLOOD: 1.7 MMOL/L (ref 0.7–2.1)
LACTIC ACID, WHOLE BLOOD: 4.6 MMOL/L (ref 0.7–2.1)
LEUKOCYTE ESTERASE, URINE: ABNORMAL
LYMPHOCYTES # BLD: 19 % (ref 24–43)
MCH RBC QN AUTO: 33.2 PG (ref 25.2–33.5)
MCHC RBC AUTO-ENTMCNC: 32.6 G/DL (ref 28.4–34.8)
MCV RBC AUTO: 101.9 FL (ref 82.6–102.9)
MDMA URINE: NORMAL
METHADONE SCREEN, URINE: NEGATIVE
METHAMPHETAMINE, URINE: NORMAL
MODE: ABNORMAL
MONOCYTES # BLD: 11 % (ref 3–12)
MUCUS: ABNORMAL
MYCOPLASMA PNEUMONIAE PCR: NOT DETECTED
NEGATIVE BASE EXCESS, VEN: 4 (ref 0–2)
NITRITE, URINE: NEGATIVE
NRBC AUTOMATED: 0 PER 100 WBC
O2 DEVICE/FLOW/%: ABNORMAL
O2 SAT, VEN: 67 % (ref 60–85)
OPIATES, URINE: NEGATIVE
OTHER OBSERVATIONS UA: ABNORMAL
OXYCODONE SCREEN URINE: NEGATIVE
PARAINFLUENZA 1 PCR: NOT DETECTED
PARAINFLUENZA 2 PCR: NOT DETECTED
PARAINFLUENZA 3 PCR: NOT DETECTED
PARAINFLUENZA 4 PCR: NOT DETECTED
PARTIAL THROMBOPLASTIN TIME: 24.4 SEC (ref 20.5–30.5)
PATIENT TEMP: ABNORMAL
PCO2, VEN: 40.3 MM HG (ref 41–51)
PDW BLD-RTO: 12 % (ref 11.8–14.4)
PH UA: 6.5 (ref 5–8)
PH VENOUS: 7.34 (ref 7.32–7.43)
PHENCYCLIDINE, URINE: NEGATIVE
PLATELET # BLD: 248 K/UL (ref 138–453)
PLATELET ESTIMATE: ABNORMAL
PMV BLD AUTO: 9.5 FL (ref 8.1–13.5)
PO2, VEN: 36.8 MM HG (ref 30–50)
POC CHLORIDE: 105 MMOL/L (ref 98–107)
POC CREATININE: 1.37 MG/DL (ref 0.51–1.19)
POC HEMATOCRIT: 40 % (ref 41–53)
POC HEMOGLOBIN: 13.5 G/DL (ref 13.5–17.5)
POC IONIZED CALCIUM: 1.06 MMOL/L (ref 1.15–1.33)
POC LACTIC ACID: 4.42 MMOL/L (ref 0.56–1.39)
POC PCO2 TEMP: ABNORMAL MM HG
POC PH TEMP: ABNORMAL
POC PO2 TEMP: ABNORMAL MM HG
POC POTASSIUM: 3.5 MMOL/L (ref 3.5–4.5)
POC SODIUM: 138 MMOL/L (ref 138–146)
POSITIVE BASE EXCESS, VEN: ABNORMAL (ref 0–3)
POTASSIUM SERPL-SCNC: 3.7 MMOL/L (ref 3.7–5.3)
PROPOXYPHENE, URINE: NORMAL
PROTEIN UA: NEGATIVE
PROTHROMBIN TIME: 13.6 SEC (ref 9–12)
RBC # BLD: 3.7 M/UL (ref 4.21–5.77)
RBC # BLD: ABNORMAL 10*6/UL
RBC UA: ABNORMAL /HPF (ref 0–2)
RENAL EPITHELIAL, UA: ABNORMAL /HPF
RESP SYNCYTIAL VIRUS PCR: NOT DETECTED
RHINO/ENTEROVIRUS PCR: DETECTED
SALICYLATE LEVEL: <1 MG/DL (ref 3–10)
SAMPLE SITE: ABNORMAL
SEG NEUTROPHILS: 68 % (ref 36–65)
SEGMENTED NEUTROPHILS ABSOLUTE COUNT: 6.08 K/UL (ref 1.5–8.1)
SODIUM BLD-SCNC: 137 MMOL/L (ref 135–144)
SPECIFIC GRAVITY UA: 1.01 (ref 1–1.03)
SPECIMEN DESCRIPTION: ABNORMAL
TEST INFORMATION: NORMAL
TOTAL CK: 197 U/L (ref 39–308)
TOTAL CO2, VENOUS: 23 MMOL/L (ref 23–30)
TOTAL PROTEIN: 7.1 G/DL (ref 6.4–8.3)
TOXIC TRICYCLIC SC,BLOOD: NEGATIVE
TRICHOMONAS: ABNORMAL
TRICYCLIC ANTIDEPRESSANTS, UR: NORMAL
TROPONIN INTERP: NORMAL
TROPONIN INTERP: NORMAL
TROPONIN T: NORMAL NG/ML
TROPONIN T: NORMAL NG/ML
TROPONIN, HIGH SENSITIVITY: 6 NG/L (ref 0–22)
TROPONIN, HIGH SENSITIVITY: 7 NG/L (ref 0–22)
TURBIDITY: CLEAR
URINE HGB: NEGATIVE
UROBILINOGEN, URINE: NORMAL
WBC # BLD: 8.8 K/UL (ref 3.5–11.3)
WBC # BLD: ABNORMAL 10*3/UL
WBC UA: ABNORMAL /HPF (ref 0–5)
YEAST: ABNORMAL

## 2019-09-22 PROCEDURE — 85730 THROMBOPLASTIN TIME PARTIAL: CPT

## 2019-09-22 PROCEDURE — G0480 DRUG TEST DEF 1-7 CLASSES: HCPCS

## 2019-09-22 PROCEDURE — 87486 CHLMYD PNEUM DNA AMP PROBE: CPT

## 2019-09-22 PROCEDURE — 85610 PROTHROMBIN TIME: CPT

## 2019-09-22 PROCEDURE — 6360000002 HC RX W HCPCS: Performed by: STUDENT IN AN ORGANIZED HEALTH CARE EDUCATION/TRAINING PROGRAM

## 2019-09-22 PROCEDURE — 96365 THER/PROPH/DIAG IV INF INIT: CPT

## 2019-09-22 PROCEDURE — 82565 ASSAY OF CREATININE: CPT

## 2019-09-22 PROCEDURE — 84484 ASSAY OF TROPONIN QUANT: CPT

## 2019-09-22 PROCEDURE — 1200000000 HC SEMI PRIVATE

## 2019-09-22 PROCEDURE — 84295 ASSAY OF SERUM SODIUM: CPT

## 2019-09-22 PROCEDURE — 84132 ASSAY OF SERUM POTASSIUM: CPT

## 2019-09-22 PROCEDURE — 71045 X-RAY EXAM CHEST 1 VIEW: CPT

## 2019-09-22 PROCEDURE — 99285 EMERGENCY DEPT VISIT HI MDM: CPT

## 2019-09-22 PROCEDURE — 86403 PARTICLE AGGLUT ANTBDY SCRN: CPT

## 2019-09-22 PROCEDURE — 82140 ASSAY OF AMMONIA: CPT

## 2019-09-22 PROCEDURE — 72125 CT NECK SPINE W/O DYE: CPT

## 2019-09-22 PROCEDURE — 80307 DRUG TEST PRSMV CHEM ANLYZR: CPT

## 2019-09-22 PROCEDURE — 70450 CT HEAD/BRAIN W/O DYE: CPT

## 2019-09-22 PROCEDURE — 82947 ASSAY GLUCOSE BLOOD QUANT: CPT

## 2019-09-22 PROCEDURE — 6360000004 HC RX CONTRAST MEDICATION: Performed by: EMERGENCY MEDICINE

## 2019-09-22 PROCEDURE — 96367 TX/PROPH/DG ADDL SEQ IV INF: CPT

## 2019-09-22 PROCEDURE — 82803 BLOOD GASES ANY COMBINATION: CPT

## 2019-09-22 PROCEDURE — 82550 ASSAY OF CK (CPK): CPT

## 2019-09-22 PROCEDURE — 2580000003 HC RX 258: Performed by: STUDENT IN AN ORGANIZED HEALTH CARE EDUCATION/TRAINING PROGRAM

## 2019-09-22 PROCEDURE — 87186 SC STD MICRODIL/AGAR DIL: CPT

## 2019-09-22 PROCEDURE — 93005 ELECTROCARDIOGRAM TRACING: CPT | Performed by: STUDENT IN AN ORGANIZED HEALTH CARE EDUCATION/TRAINING PROGRAM

## 2019-09-22 PROCEDURE — 87040 BLOOD CULTURE FOR BACTERIA: CPT

## 2019-09-22 PROCEDURE — 71260 CT THORAX DX C+: CPT

## 2019-09-22 PROCEDURE — 87086 URINE CULTURE/COLONY COUNT: CPT

## 2019-09-22 PROCEDURE — 81001 URINALYSIS AUTO W/SCOPE: CPT

## 2019-09-22 PROCEDURE — 85014 HEMATOCRIT: CPT

## 2019-09-22 PROCEDURE — 6370000000 HC RX 637 (ALT 250 FOR IP): Performed by: STUDENT IN AN ORGANIZED HEALTH CARE EDUCATION/TRAINING PROGRAM

## 2019-09-22 PROCEDURE — 80053 COMPREHEN METABOLIC PANEL: CPT

## 2019-09-22 PROCEDURE — 87798 DETECT AGENT NOS DNA AMP: CPT

## 2019-09-22 PROCEDURE — 74177 CT ABD & PELVIS W/CONTRAST: CPT

## 2019-09-22 PROCEDURE — 82435 ASSAY OF BLOOD CHLORIDE: CPT

## 2019-09-22 PROCEDURE — 82330 ASSAY OF CALCIUM: CPT

## 2019-09-22 PROCEDURE — 87581 M.PNEUMON DNA AMP PROBE: CPT

## 2019-09-22 PROCEDURE — 87633 RESP VIRUS 12-25 TARGETS: CPT

## 2019-09-22 PROCEDURE — 83605 ASSAY OF LACTIC ACID: CPT

## 2019-09-22 PROCEDURE — 87077 CULTURE AEROBIC IDENTIFY: CPT

## 2019-09-22 PROCEDURE — 85025 COMPLETE CBC W/AUTO DIFF WBC: CPT

## 2019-09-22 RX ORDER — LORAZEPAM 2 MG/ML
3 INJECTION INTRAMUSCULAR
Status: DISCONTINUED | OUTPATIENT
Start: 2019-09-22 | End: 2019-09-23 | Stop reason: HOSPADM

## 2019-09-22 RX ORDER — SODIUM CHLORIDE 0.9 % (FLUSH) 0.9 %
10 SYRINGE (ML) INJECTION EVERY 12 HOURS SCHEDULED
Status: DISCONTINUED | OUTPATIENT
Start: 2019-09-22 | End: 2019-09-23 | Stop reason: HOSPADM

## 2019-09-22 RX ORDER — MULTIVITAMIN WITH FOLIC ACID 400 MCG
1 TABLET ORAL DAILY
Status: DISCONTINUED | OUTPATIENT
Start: 2019-09-23 | End: 2019-09-23 | Stop reason: HOSPADM

## 2019-09-22 RX ORDER — 0.9 % SODIUM CHLORIDE 0.9 %
1000 INTRAVENOUS SOLUTION INTRAVENOUS ONCE
Status: COMPLETED | OUTPATIENT
Start: 2019-09-22 | End: 2019-09-22

## 2019-09-22 RX ORDER — FOLIC ACID 1 MG/1
1 TABLET ORAL DAILY
Status: DISCONTINUED | OUTPATIENT
Start: 2019-09-23 | End: 2019-09-23 | Stop reason: HOSPADM

## 2019-09-22 RX ORDER — LORAZEPAM 2 MG/1
2 TABLET ORAL
Status: DISCONTINUED | OUTPATIENT
Start: 2019-09-22 | End: 2019-09-23 | Stop reason: HOSPADM

## 2019-09-22 RX ORDER — ONDANSETRON 2 MG/ML
4 INJECTION INTRAMUSCULAR; INTRAVENOUS EVERY 6 HOURS PRN
Status: DISCONTINUED | OUTPATIENT
Start: 2019-09-22 | End: 2019-09-23 | Stop reason: HOSPADM

## 2019-09-22 RX ORDER — SODIUM CHLORIDE 9 MG/ML
INJECTION, SOLUTION INTRAVENOUS CONTINUOUS
Status: DISCONTINUED | OUTPATIENT
Start: 2019-09-22 | End: 2019-09-23 | Stop reason: HOSPADM

## 2019-09-22 RX ORDER — LEVOFLOXACIN 5 MG/ML
500 INJECTION, SOLUTION INTRAVENOUS EVERY 24 HOURS
Status: DISCONTINUED | OUTPATIENT
Start: 2019-09-22 | End: 2019-09-22

## 2019-09-22 RX ORDER — 0.9 % SODIUM CHLORIDE 0.9 %
250 INTRAVENOUS SOLUTION INTRAVENOUS ONCE
Status: COMPLETED | OUTPATIENT
Start: 2019-09-22 | End: 2019-09-22

## 2019-09-22 RX ORDER — LORAZEPAM 1 MG/1
1 TABLET ORAL
Status: DISCONTINUED | OUTPATIENT
Start: 2019-09-22 | End: 2019-09-23 | Stop reason: HOSPADM

## 2019-09-22 RX ORDER — IPRATROPIUM BROMIDE AND ALBUTEROL SULFATE 2.5; .5 MG/3ML; MG/3ML
1 SOLUTION RESPIRATORY (INHALATION)
Status: DISCONTINUED | OUTPATIENT
Start: 2019-09-22 | End: 2019-09-23 | Stop reason: HOSPADM

## 2019-09-22 RX ORDER — LORAZEPAM 2 MG/ML
2 INJECTION INTRAMUSCULAR
Status: DISCONTINUED | OUTPATIENT
Start: 2019-09-22 | End: 2019-09-23 | Stop reason: HOSPADM

## 2019-09-22 RX ORDER — SODIUM CHLORIDE 0.9 % (FLUSH) 0.9 %
10 SYRINGE (ML) INJECTION PRN
Status: DISCONTINUED | OUTPATIENT
Start: 2019-09-22 | End: 2019-09-23 | Stop reason: HOSPADM

## 2019-09-22 RX ORDER — LORAZEPAM 2 MG/ML
4 INJECTION INTRAMUSCULAR
Status: DISCONTINUED | OUTPATIENT
Start: 2019-09-22 | End: 2019-09-23 | Stop reason: HOSPADM

## 2019-09-22 RX ORDER — THIAMINE MONONITRATE (VIT B1) 100 MG
100 TABLET ORAL DAILY
Status: DISCONTINUED | OUTPATIENT
Start: 2019-09-23 | End: 2019-09-23 | Stop reason: HOSPADM

## 2019-09-22 RX ORDER — LORAZEPAM 2 MG/ML
1 INJECTION INTRAMUSCULAR
Status: DISCONTINUED | OUTPATIENT
Start: 2019-09-22 | End: 2019-09-23 | Stop reason: HOSPADM

## 2019-09-22 RX ORDER — LORAZEPAM 2 MG/1
4 TABLET ORAL
Status: DISCONTINUED | OUTPATIENT
Start: 2019-09-22 | End: 2019-09-23 | Stop reason: HOSPADM

## 2019-09-22 RX ORDER — LEVOFLOXACIN 500 MG/1
500 TABLET, FILM COATED ORAL DAILY
Status: DISCONTINUED | OUTPATIENT
Start: 2019-09-22 | End: 2019-09-23 | Stop reason: HOSPADM

## 2019-09-22 RX ADMIN — SODIUM CHLORIDE 1000 ML: 9 INJECTION, SOLUTION INTRAVENOUS at 10:50

## 2019-09-22 RX ADMIN — AZITHROMYCIN MONOHYDRATE 500 MG: 500 INJECTION, POWDER, LYOPHILIZED, FOR SOLUTION INTRAVENOUS at 14:05

## 2019-09-22 RX ADMIN — CEFTRIAXONE SODIUM 1 G: 1 INJECTION, POWDER, FOR SOLUTION INTRAMUSCULAR; INTRAVENOUS at 13:20

## 2019-09-22 RX ADMIN — IOVERSOL 75 ML: 741 INJECTION INTRA-ARTERIAL; INTRAVENOUS at 12:18

## 2019-09-22 RX ADMIN — SODIUM CHLORIDE 1000 ML: 9 INJECTION, SOLUTION INTRAVENOUS at 11:14

## 2019-09-22 RX ADMIN — LEVOFLOXACIN 500 MG: 500 TABLET, FILM COATED ORAL at 18:27

## 2019-09-22 RX ADMIN — SODIUM CHLORIDE: 9 INJECTION, SOLUTION INTRAVENOUS at 17:00

## 2019-09-22 RX ADMIN — ENOXAPARIN SODIUM 40 MG: 40 INJECTION SUBCUTANEOUS at 18:26

## 2019-09-22 RX ADMIN — SODIUM CHLORIDE, PRESERVATIVE FREE 10 ML: 5 INJECTION INTRAVENOUS at 20:30

## 2019-09-22 RX ADMIN — SODIUM CHLORIDE, PRESERVATIVE FREE 10 ML: 5 INJECTION INTRAVENOUS at 23:42

## 2019-09-22 RX ADMIN — SODIUM CHLORIDE 250 ML: 0.9 INJECTION, SOLUTION INTRAVENOUS at 12:42

## 2019-09-22 ASSESSMENT — PAIN DESCRIPTION - FREQUENCY: FREQUENCY: OTHER (COMMENT)

## 2019-09-22 ASSESSMENT — PAIN SCALES - GENERAL: PAINLEVEL_OUTOF10: 10

## 2019-09-22 ASSESSMENT — PAIN DESCRIPTION - PAIN TYPE: TYPE: ACUTE PAIN

## 2019-09-22 ASSESSMENT — PAIN DESCRIPTION - LOCATION: LOCATION: HEAD

## 2019-09-23 VITALS
TEMPERATURE: 97.8 F | SYSTOLIC BLOOD PRESSURE: 147 MMHG | WEIGHT: 144.1 LBS | HEIGHT: 73 IN | HEART RATE: 70 BPM | RESPIRATION RATE: 18 BRPM | BODY MASS INDEX: 19.1 KG/M2 | DIASTOLIC BLOOD PRESSURE: 87 MMHG | OXYGEN SATURATION: 100 %

## 2019-09-23 LAB
ABSOLUTE EOS #: 0.18 K/UL (ref 0–0.44)
ABSOLUTE IMMATURE GRANULOCYTE: 0.03 K/UL (ref 0–0.3)
ABSOLUTE LYMPH #: 1.59 K/UL (ref 1.1–3.7)
ABSOLUTE MONO #: 0.78 K/UL (ref 0.1–1.2)
ANION GAP SERPL CALCULATED.3IONS-SCNC: 11 MMOL/L (ref 9–17)
BASOPHILS # BLD: 0 % (ref 0–2)
BASOPHILS ABSOLUTE: <0.03 K/UL (ref 0–0.2)
BUN BLDV-MCNC: 9 MG/DL (ref 8–23)
BUN/CREAT BLD: ABNORMAL (ref 9–20)
CALCIUM SERPL-MCNC: 8 MG/DL (ref 8.6–10.4)
CHLORIDE BLD-SCNC: 105 MMOL/L (ref 98–107)
CO2: 21 MMOL/L (ref 20–31)
CREAT SERPL-MCNC: 0.82 MG/DL (ref 0.7–1.2)
DIFFERENTIAL TYPE: ABNORMAL
EKG ATRIAL RATE: 71 BPM
EKG P AXIS: 59 DEGREES
EKG P-R INTERVAL: 158 MS
EKG Q-T INTERVAL: 442 MS
EKG QRS DURATION: 88 MS
EKG QTC CALCULATION (BAZETT): 480 MS
EKG R AXIS: 64 DEGREES
EKG T AXIS: 57 DEGREES
EKG VENTRICULAR RATE: 71 BPM
EOSINOPHILS RELATIVE PERCENT: 2 % (ref 1–4)
GFR AFRICAN AMERICAN: >60 ML/MIN
GFR NON-AFRICAN AMERICAN: >60 ML/MIN
GFR SERPL CREATININE-BSD FRML MDRD: ABNORMAL ML/MIN/{1.73_M2}
GFR SERPL CREATININE-BSD FRML MDRD: ABNORMAL ML/MIN/{1.73_M2}
GLUCOSE BLD-MCNC: 79 MG/DL (ref 70–99)
HCT VFR BLD CALC: 43.1 % (ref 40.7–50.3)
HEMOGLOBIN: 13.3 G/DL (ref 13–17)
IMMATURE GRANULOCYTES: 0 %
LYMPHOCYTES # BLD: 19 % (ref 24–43)
MCH RBC QN AUTO: 32.2 PG (ref 25.2–33.5)
MCHC RBC AUTO-ENTMCNC: 30.9 G/DL (ref 28.4–34.8)
MCV RBC AUTO: 104.4 FL (ref 82.6–102.9)
MONOCYTES # BLD: 9 % (ref 3–12)
NRBC AUTOMATED: 0 PER 100 WBC
PDW BLD-RTO: 12.1 % (ref 11.8–14.4)
PLATELET # BLD: 223 K/UL (ref 138–453)
PLATELET ESTIMATE: ABNORMAL
PMV BLD AUTO: 9.7 FL (ref 8.1–13.5)
POTASSIUM SERPL-SCNC: 4 MMOL/L (ref 3.7–5.3)
RBC # BLD: 4.13 M/UL (ref 4.21–5.77)
RBC # BLD: ABNORMAL 10*6/UL
SEG NEUTROPHILS: 70 % (ref 36–65)
SEGMENTED NEUTROPHILS ABSOLUTE COUNT: 5.98 K/UL (ref 1.5–8.1)
SODIUM BLD-SCNC: 137 MMOL/L (ref 135–144)
WBC # BLD: 8.6 K/UL (ref 3.5–11.3)
WBC # BLD: ABNORMAL 10*3/UL

## 2019-09-23 PROCEDURE — 99223 1ST HOSP IP/OBS HIGH 75: CPT | Performed by: INTERNAL MEDICINE

## 2019-09-23 PROCEDURE — 36415 COLL VENOUS BLD VENIPUNCTURE: CPT

## 2019-09-23 PROCEDURE — 94640 AIRWAY INHALATION TREATMENT: CPT

## 2019-09-23 PROCEDURE — 94761 N-INVAS EAR/PLS OXIMETRY MLT: CPT

## 2019-09-23 PROCEDURE — 6370000000 HC RX 637 (ALT 250 FOR IP): Performed by: STUDENT IN AN ORGANIZED HEALTH CARE EDUCATION/TRAINING PROGRAM

## 2019-09-23 PROCEDURE — 80048 BASIC METABOLIC PNL TOTAL CA: CPT

## 2019-09-23 PROCEDURE — 85025 COMPLETE CBC W/AUTO DIFF WBC: CPT

## 2019-09-23 PROCEDURE — 6360000002 HC RX W HCPCS: Performed by: STUDENT IN AN ORGANIZED HEALTH CARE EDUCATION/TRAINING PROGRAM

## 2019-09-23 PROCEDURE — 93010 ELECTROCARDIOGRAM REPORT: CPT | Performed by: INTERNAL MEDICINE

## 2019-09-23 RX ORDER — FLUTICASONE PROPIONATE 50 MCG
1 SPRAY, SUSPENSION (ML) NASAL DAILY
Qty: 2 BOTTLE | Refills: 1 | Status: SHIPPED | OUTPATIENT
Start: 2019-09-23

## 2019-09-23 RX ORDER — LEVOFLOXACIN 500 MG/1
500 TABLET, FILM COATED ORAL DAILY
Qty: 5 TABLET | Refills: 0 | Status: SHIPPED | OUTPATIENT
Start: 2019-09-24 | End: 2019-09-29

## 2019-09-23 RX ORDER — FOLIC ACID 1 MG/1
1 TABLET ORAL DAILY
Qty: 30 TABLET | Refills: 3 | Status: SHIPPED | OUTPATIENT
Start: 2019-09-23

## 2019-09-23 RX ORDER — BENZONATATE 100 MG/1
100 CAPSULE ORAL 2 TIMES DAILY
Status: DISCONTINUED | OUTPATIENT
Start: 2019-09-23 | End: 2019-09-23 | Stop reason: HOSPADM

## 2019-09-23 RX ORDER — BUDESONIDE AND FORMOTEROL FUMARATE DIHYDRATE 160; 4.5 UG/1; UG/1
2 AEROSOL RESPIRATORY (INHALATION) 2 TIMES DAILY
Qty: 3 INHALER | Refills: 1 | Status: SHIPPED | OUTPATIENT
Start: 2019-09-23

## 2019-09-23 RX ORDER — LANOLIN ALCOHOL/MO/W.PET/CERES
100 CREAM (GRAM) TOPICAL DAILY
Qty: 30 TABLET | Refills: 3 | Status: SHIPPED | OUTPATIENT
Start: 2019-09-24

## 2019-09-23 RX ORDER — ACETAMINOPHEN 325 MG/1
650 TABLET ORAL EVERY 4 HOURS PRN
Status: DISCONTINUED | OUTPATIENT
Start: 2019-09-23 | End: 2019-09-23 | Stop reason: HOSPADM

## 2019-09-23 RX ADMIN — IPRATROPIUM BROMIDE AND ALBUTEROL SULFATE 1 AMPULE: .5; 3 SOLUTION RESPIRATORY (INHALATION) at 08:09

## 2019-09-23 RX ADMIN — Medication 100 MG: at 08:47

## 2019-09-23 RX ADMIN — LORAZEPAM 2 MG: 2 INJECTION INTRAMUSCULAR; INTRAVENOUS at 03:57

## 2019-09-23 RX ADMIN — MOMETASONE FUROATE AND FORMOTEROL FUMARATE DIHYDRATE 2 PUFF: 100; 5 AEROSOL RESPIRATORY (INHALATION) at 08:09

## 2019-09-23 RX ADMIN — LEVOFLOXACIN 500 MG: 500 TABLET, FILM COATED ORAL at 08:47

## 2019-09-23 RX ADMIN — BENZONATATE 100 MG: 100 CAPSULE ORAL at 06:07

## 2019-09-23 RX ADMIN — ACETAMINOPHEN 650 MG: 325 TABLET ORAL at 08:47

## 2019-09-23 RX ADMIN — ENOXAPARIN SODIUM 40 MG: 40 INJECTION SUBCUTANEOUS at 08:47

## 2019-09-23 RX ADMIN — LORAZEPAM 1 MG: 1 TABLET ORAL at 00:41

## 2019-09-23 RX ADMIN — THERA TABS 1 TABLET: TAB at 08:47

## 2019-09-23 ASSESSMENT — PAIN DESCRIPTION - LOCATION: LOCATION: HEAD

## 2019-09-23 ASSESSMENT — PAIN DESCRIPTION - PAIN TYPE: TYPE: ACUTE PAIN

## 2019-09-23 ASSESSMENT — PAIN DESCRIPTION - FREQUENCY: FREQUENCY: OTHER (COMMENT)

## 2019-09-23 ASSESSMENT — PAIN DESCRIPTION - DESCRIPTORS: DESCRIPTORS: SHARP

## 2019-09-23 ASSESSMENT — PAIN SCALES - GENERAL: PAINLEVEL_OUTOF10: 3

## 2019-09-24 LAB
CULTURE: ABNORMAL
Lab: ABNORMAL
SPECIMEN DESCRIPTION: ABNORMAL

## 2019-09-28 LAB
CULTURE: NORMAL
CULTURE: NORMAL
Lab: NORMAL
Lab: NORMAL
SPECIMEN DESCRIPTION: NORMAL
SPECIMEN DESCRIPTION: NORMAL

## 2019-12-26 ENCOUNTER — HOSPITAL ENCOUNTER (EMERGENCY)
Age: 69
Discharge: HOME | End: 2019-12-27
Attending: EMERGENCY MEDICINE
Payer: MEDICARE

## 2019-12-26 ENCOUNTER — APPOINTMENT (OUTPATIENT)
Dept: CT IMAGING | Age: 69
End: 2019-12-26
Payer: MEDICARE

## 2019-12-26 DIAGNOSIS — T50.904A DRUG OVERDOSE, UNDETERMINED INTENT, INITIAL ENCOUNTER: Primary | ICD-10-CM

## 2019-12-26 DIAGNOSIS — F10.929 ACUTE ALCOHOLIC INTOXICATION WITH COMPLICATION (HCC): ICD-10-CM

## 2019-12-26 LAB
ETHANOL PERCENT: 0.27 %
ETHANOL: 269 MG/DL

## 2019-12-26 PROCEDURE — 6360000002 HC RX W HCPCS: Performed by: STUDENT IN AN ORGANIZED HEALTH CARE EDUCATION/TRAINING PROGRAM

## 2019-12-26 PROCEDURE — 96375 TX/PRO/DX INJ NEW DRUG ADDON: CPT

## 2019-12-26 PROCEDURE — 2580000003 HC RX 258: Performed by: STUDENT IN AN ORGANIZED HEALTH CARE EDUCATION/TRAINING PROGRAM

## 2019-12-26 PROCEDURE — 99284 EMERGENCY DEPT VISIT MOD MDM: CPT

## 2019-12-26 PROCEDURE — 72125 CT NECK SPINE W/O DYE: CPT

## 2019-12-26 PROCEDURE — 96365 THER/PROPH/DIAG IV INF INIT: CPT

## 2019-12-26 PROCEDURE — G0480 DRUG TEST DEF 1-7 CLASSES: HCPCS

## 2019-12-26 PROCEDURE — 70450 CT HEAD/BRAIN W/O DYE: CPT

## 2019-12-26 RX ORDER — SODIUM CHLORIDE 9 MG/ML
1000 INJECTION, SOLUTION INTRAVENOUS CONTINUOUS
Status: DISCONTINUED | OUTPATIENT
Start: 2019-12-26 | End: 2019-12-27 | Stop reason: HOSPADM

## 2019-12-26 RX ORDER — ONDANSETRON 2 MG/ML
4 INJECTION INTRAMUSCULAR; INTRAVENOUS ONCE
Status: COMPLETED | OUTPATIENT
Start: 2019-12-26 | End: 2019-12-26

## 2019-12-26 RX ADMIN — ONDANSETRON 4 MG: 2 INJECTION INTRAMUSCULAR; INTRAVENOUS at 21:18

## 2019-12-26 RX ADMIN — THIAMINE HYDROCHLORIDE 100 MG: 100 INJECTION, SOLUTION INTRAMUSCULAR; INTRAVENOUS at 22:27

## 2019-12-26 RX ADMIN — SODIUM CHLORIDE 1000 ML: 9 INJECTION, SOLUTION INTRAVENOUS at 21:18

## 2019-12-26 ASSESSMENT — ENCOUNTER SYMPTOMS
CONSTIPATION: 0
BACK PAIN: 0
WHEEZING: 0
PHOTOPHOBIA: 0
COUGH: 0
NAUSEA: 0
DIARRHEA: 0
SHORTNESS OF BREATH: 0
VOMITING: 0
CHEST TIGHTNESS: 0
ABDOMINAL PAIN: 0

## 2019-12-27 ENCOUNTER — APPOINTMENT (OUTPATIENT)
Dept: GENERAL RADIOLOGY | Age: 69
End: 2019-12-27
Payer: MEDICARE

## 2019-12-27 VITALS
SYSTOLIC BLOOD PRESSURE: 147 MMHG | TEMPERATURE: 97 F | OXYGEN SATURATION: 99 % | RESPIRATION RATE: 18 BRPM | DIASTOLIC BLOOD PRESSURE: 84 MMHG | HEART RATE: 86 BPM

## 2019-12-27 PROCEDURE — 73030 X-RAY EXAM OF SHOULDER: CPT

## 2019-12-27 PROCEDURE — 71045 X-RAY EXAM CHEST 1 VIEW: CPT

## 2019-12-27 RX ORDER — IBUPROFEN 800 MG/1
800 TABLET ORAL EVERY 8 HOURS PRN
Qty: 30 TABLET | Refills: 0 | Status: SHIPPED | OUTPATIENT
Start: 2019-12-27

## 2020-01-31 ENCOUNTER — HOSPITAL ENCOUNTER (EMERGENCY)
Age: 70
Discharge: HOME | End: 2020-01-31
Attending: EMERGENCY MEDICINE
Payer: MEDICARE

## 2020-01-31 VITALS
OXYGEN SATURATION: 96 % | HEIGHT: 73 IN | WEIGHT: 144 LBS | SYSTOLIC BLOOD PRESSURE: 114 MMHG | DIASTOLIC BLOOD PRESSURE: 76 MMHG | HEART RATE: 64 BPM | BODY MASS INDEX: 19.09 KG/M2 | TEMPERATURE: 97.7 F | RESPIRATION RATE: 19 BRPM

## 2020-01-31 LAB
ACETAMINOPHEN LEVEL: <5 UG/ML (ref 10–30)
ETHANOL PERCENT: 0.09 %
ETHANOL: 86 MG/DL
SALICYLATE LEVEL: <1 MG/DL (ref 3–10)
TOXIC TRICYCLIC SC,BLOOD: NEGATIVE

## 2020-01-31 PROCEDURE — 2580000003 HC RX 258: Performed by: STUDENT IN AN ORGANIZED HEALTH CARE EDUCATION/TRAINING PROGRAM

## 2020-01-31 PROCEDURE — 99284 EMERGENCY DEPT VISIT MOD MDM: CPT

## 2020-01-31 PROCEDURE — G0480 DRUG TEST DEF 1-7 CLASSES: HCPCS

## 2020-01-31 PROCEDURE — 80307 DRUG TEST PRSMV CHEM ANLYZR: CPT

## 2020-01-31 PROCEDURE — 93005 ELECTROCARDIOGRAM TRACING: CPT | Performed by: STUDENT IN AN ORGANIZED HEALTH CARE EDUCATION/TRAINING PROGRAM

## 2020-01-31 RX ORDER — ONDANSETRON 2 MG/ML
4 INJECTION INTRAMUSCULAR; INTRAVENOUS ONCE
Status: DISCONTINUED | OUTPATIENT
Start: 2020-01-31 | End: 2020-01-31 | Stop reason: HOSPADM

## 2020-01-31 RX ORDER — NALOXONE HYDROCHLORIDE 0.4 MG/ML
0.4 INJECTION, SOLUTION INTRAMUSCULAR; INTRAVENOUS; SUBCUTANEOUS ONCE
Status: DISCONTINUED | OUTPATIENT
Start: 2020-01-31 | End: 2020-01-31 | Stop reason: HOSPADM

## 2020-01-31 RX ORDER — SODIUM CHLORIDE, SODIUM LACTATE, POTASSIUM CHLORIDE, AND CALCIUM CHLORIDE .6; .31; .03; .02 G/100ML; G/100ML; G/100ML; G/100ML
1000 INJECTION, SOLUTION INTRAVENOUS ONCE
Status: COMPLETED | OUTPATIENT
Start: 2020-01-31 | End: 2020-01-31

## 2020-01-31 RX ADMIN — SODIUM CHLORIDE, POTASSIUM CHLORIDE, SODIUM LACTATE AND CALCIUM CHLORIDE 1000 ML: 600; 310; 30; 20 INJECTION, SOLUTION INTRAVENOUS at 15:02

## 2020-01-31 ASSESSMENT — ENCOUNTER SYMPTOMS
BACK PAIN: 0
COLOR CHANGE: 0
CHEST TIGHTNESS: 0
ABDOMINAL DISTENTION: 0
COUGH: 0
DIARRHEA: 0
WHEEZING: 0
VOMITING: 0
NAUSEA: 0
ABDOMINAL PAIN: 0
SHORTNESS OF BREATH: 0

## 2020-01-31 NOTE — ED NOTES
Pt presents to ED via EMS w/ c/o K2 OD and ETOH. Pt admits to smoking a half a puff of what pt thought was marijuana. Pt also admits to consuming 24 oz beer. Pt is A&OX4. EMS states pt had systolic in 55S, place 91G IV in left AC, admin 800 ml 0.9% NS bolus.      Howie Bermudez RN  01/31/20 1299

## 2020-01-31 NOTE — ED PROVIDER NOTES
[address]  Emergency Department Encounter  Emergency Medicine Resident     Pt Name: Ne Song  MRN: [id number]  [place] 1950  Date of evaluation: 1/31/20  PCP:  Marilia Travis MD    [address] [place] [address]       Chief Complaint   Patient presents with    Drug Overdose    Alcohol Intoxication     1 24 oz can       HISTORY OF PRESENT ILLNESS  (Location/Symptom, Timing/Onset, Context/Setting, Quality, Duration, Modifying Factors, Severity.)    Ne Song is a 77 y.o. male who presents with possible drug overdose. Patient was found down unconscious, in an area known for recent K2 overdoses. Patient states that he took \"1 puff of a marijuana cigar\" and had one beer. States that he does smoke marijuana but states that he never uses K2. States that he is an avid drinker. Patient is fatigued and somewhat somnolent but will arouse to answer questions and will follow commands. Denies any complaints of headache, neck pain, chest pain, abdominal pain, shortness of breath. States that he remembers smoking but he does not remember passing out. States he does remember EMS and writing on the ambulance. PAST MEDICAL / SURGICAL / SOCIAL / FAMILY HISTORY    has a past medical history of Arthritis, COPD (chronic obstructive pulmonary disease) ([facility] [address] Hypertension, MRSA (methicillin resistant staph aureus) culture positive, and Substance abuse ([facility] [address] has a past surgical history that includes orthopedic surgery (Left).     Social History     Socioeconomic History    Marital status:      Spouse name: Not on file    Number of children: Not on file    Years of education: Not on file    Highest education level: Not on file   Occupational History    Not on file   Social Needs    Financial resource strain: Not on file    Food insecurity:     Worry: Not on file     Inability: Not on file    Transportation needs:     Medical: Not on file     Non-medical: Not on file   Tobacco Use    Smoking 50 MCG/ACT nasal spray 1 spray by Each Nostril route daily 9/23/19   Marilyn Ellis MD   budesonide-formoterol [facility]) 160-4.5 MCG/ACT AERO Inhale 2 puffs into the lungs 2 times daily 9/23/19   Marilyn Ellis MD   diclofenac sodium (VOLTAREN) 1 % GEL Apply 4 g topically 4 times daily 11/13/17 12/13/17  Boaz Pacheco MD       REVIEW OF SYSTEMS    (2-9 systems for level 4, 10 or more for level 5)    Review of Systems   Constitutional: Positive for fatigue. Negative for chills and fever. Respiratory: Negative for cough, chest tightness, shortness of breath and wheezing. Cardiovascular: Negative for chest pain and palpitations. Gastrointestinal: Negative for abdominal distention, abdominal pain, diarrhea, nausea and vomiting. Genitourinary: Negative for dysuria, frequency and urgency. Musculoskeletal: Negative for back pain. Skin: Negative for color change and pallor. Neurological: Negative for dizziness, syncope, weakness, light-headedness and headaches. Psychiatric/Behavioral: Negative for agitation, confusion and suicidal ideas. The patient is not nervous/anxious. All other systems reviewed and are negative. PHYSICAL EXAM   (up to 7 for level 4, 8 or more for level 5)    INITIAL VITALS:   ED Triage Vitals [01/31/20 1423]   BP Temp Temp Source Pulse Resp SpO2 Height Weight   97/73 97.7 °F (36.5 °C) Oral 75 17 95 % 6' 1\" (1.854 m) 144 lb (65.3 kg)       Physical Exam  Vitals signs and nursing note reviewed. Constitutional:       General: He is sleeping. He is not in acute distress. Appearance: Normal appearance. He is not ill-appearing. Cardiovascular:      Rate and Rhythm: Normal rate and regular rhythm. Pulses: Normal pulses. Radial pulses are 2+ on the right side and 2+ on the left side. Heart sounds: Normal heart sounds. No murmur. No friction rub. Pulmonary:      Effort: Pulmonary effort is normal. No respiratory distress.       Breath sounds: Normal breath one beer. Patient is sleepy but cooperative. Has no other complaints. We will plan to check an ED tox screen on patient, provide fluids as he does appear slightly dehydrated. Plan to reassess. Once patient is alert, eating, talking, plan on discharge. EMERGENCY DEPARTMENT COURSE:  ED Course as of Jan 31 1654 Fri Jan 31, 2020   1540 Ethanol level 86. Patient still sleeping. Will reevaluate when awake. [AP]   1600 Patient reevaluated. Still sleeping but arousable. Answers most questions. [AP]      ED Course User Index  [AP] Lorie Mills MD     Patient signed out to Dr. Zayas Half    MDM  Number of Diagnoses or Management Options  Polysubstance abuse [facility]): established, worsening     Amount and/or Complexity of Data Reviewed  Clinical lab tests: ordered and reviewed    Risk of Complications, Morbidity, and/or Mortality  Presenting problems: low  Diagnostic procedures: low  Management options: low    Patient Progress  Patient progress: stable      PROCEDURES:  none    CONSULTS:  None    CRITICAL CARE:  Please see attending note    FINAL IMPRESSION     1. Polysubstance abuse ([facility] [address]          DISPOSITION / PLAN   DISPOSITION      PATIENT REFERRED TO:  No follow-up provider specified.     DISCHARGE MEDICATIONS:  New Prescriptions    No medications on file       Lorie Mills DO  Emergency Medicine Resident Physician, PGY-1    (Please note that portions of this note were completed with a voice recognition program.  Efforts were made to edit the dictations but occasionally words are mis-transcribed.)          Lorie Mills MD  01/31/20 2582

## 2020-01-31 NOTE — ED PROVIDER NOTES
[facility] ED  Emergency Department  Emergency Medicine Resident Sign-out     Care of Alfred Flor was assumed from Dr. Maria Eugenia Young and is being seen for Drug Overdose and Alcohol Intoxication (1 24 oz can)  . The patient's initial evaluation and plan have been discussed with the prior provider who initially evaluated the patient. EMERGENCY DEPARTMENT COURSE / MEDICAL DECISION MAKING:       MEDICATIONS GIVEN:  Orders Placed This Encounter   Medications    DISCONTD: ondansetron (ZOFRAN) injection 4 mg    DISCONTD: naloxone (NARCAN) injection 0.4 mg    lactated ringers bolus       LABS / RADIOLOGY:     Labs Reviewed   TOX SCR, BLD, ED - Abnormal; Notable for the following components:       Result Value    Ethanol 86 (*)     Ethanol percent 5.697 (*)     Salicylate Lvl <1 (*)     Acetaminophen Level <5 (*)     All other components within normal limits       No results found. RECENT VITALS:     Temp: 97.7 °F (36.5 °C),  Pulse: 64, Resp: 19, BP: 114/76, SpO2: 96 %      This patient is a 77 y.o. Male with K2 overdose along with alcohol intoxication. Ethanol level 0.08. Waiting patient to become more  alert. Discharge after. ED Course as of Feb 01 0306 Fri Jan 31, 2020   1540 Ethanol level 86. Patient still sleeping. Will reevaluate when awake. [AP]   1600 Patient reevaluated. Still sleeping but arousable. Answers most questions. [AP]      ED Course User Index  [AP] Elissa Obrien MD       Patient reassessed. He denies any complaints at this time and wishes to leave the emergency department. He is alert and oriented x3, and has the capacity make decisions. He is denying any medical complaints this time I feel he is stable for discharge. Strict return precautions given. OUTSTANDING TASKS / RECOMMENDATIONS:    1. Reassess, likely discharge home. FINAL IMPRESSION:     1.  Polysubstance abuse ([facility] [address]        DISPOSITION:         DISPOSITION:  [x]  Discharge   []

## 2020-01-31 NOTE — ED NOTES
Bed: 22  Expected date:   Expected time:   Means of arrival:   Comments:   W Rachel Alford,Fl 5, RN  01/31/20 7345

## 2020-02-01 LAB
EKG ATRIAL RATE: 79 BPM
EKG P AXIS: 72 DEGREES
EKG P-R INTERVAL: 170 MS
EKG Q-T INTERVAL: 412 MS
EKG QRS DURATION: 86 MS
EKG QTC CALCULATION (BAZETT): 472 MS
EKG R AXIS: 41 DEGREES
EKG T AXIS: 49 DEGREES
EKG VENTRICULAR RATE: 79 BPM

## 2020-02-01 PROCEDURE — 93010 ELECTROCARDIOGRAM REPORT: CPT | Performed by: INTERNAL MEDICINE

## 2020-03-06 ENCOUNTER — CARE COORDINATION (OUTPATIENT)
Dept: CARE COORDINATION | Age: 70
End: 2020-03-06

## 2020-03-06 NOTE — CARE COORDINATION
Ambulatory Care Coordination Note  3/6/2020  CM Risk Score: 2  Charlson 10 Year Mortality Risk Score: 23%     ACC: Stefanie Ackerman RN    Summary Note: called patient and explained the nature of the call. Patient stated that he's not interested and hung up. Will remove from [facility] as declined    Ambulatory Care Coordination Assessment    Care Coordination Protocol  Week 1 - Initial Assessment     Do you have all of your prescriptions and are they filled?:  Yes                          Suggested Interventions and Community Resources                  Prior to Admission medications    Medication Sig Start Date End Date Taking? Authorizing Provider   ibuprofen (ADVIL;MOTRIN) 800 MG tablet Take 1 tablet by mouth every 8 hours as needed for Pain 12/27/19   Cruz Johnson MD   folic acid (FOLVITE) 1 MG tablet Take 1 tablet by mouth daily 9/23/19   Blayne Guadarrama MD   thiamine 100 MG tablet Take 1 tablet by mouth daily 9/24/19   Blayne Guadarrama MD   tiotropium (Ardean Nipper) 18 MCG inhalation capsule Inhale 1 capsule into the lungs daily 9/23/19   Blayne Guadarrama MD   fluticasone Shey Estrada) 50 MCG/ACT nasal spray 1 spray by Each Nostril route daily 9/23/19   Blayne Guadarrama MD   budesonide-formoterol [facility]) 160-4.5 MCG/ACT AERO Inhale 2 puffs into the lungs 2 times daily 9/23/19   Blayne Guadarrama MD   diclofenac sodium (VOLTAREN) 1 % GEL Apply 4 g topically 4 times daily 11/13/17 12/13/17  Rosa Mccarty MD       No future appointments.

## 2020-08-29 ENCOUNTER — HOSPITAL ENCOUNTER (EMERGENCY)
Age: 70
Discharge: LEFT BEFORE DISCHARGE | End: 2020-08-29
Attending: EMERGENCY MEDICINE
Payer: MEDICARE

## 2020-08-29 VITALS
RESPIRATION RATE: 15 BRPM | SYSTOLIC BLOOD PRESSURE: 123 MMHG | BODY MASS INDEX: 19.13 KG/M2 | DIASTOLIC BLOOD PRESSURE: 77 MMHG | HEART RATE: 85 BPM | TEMPERATURE: 97.4 F | WEIGHT: 145 LBS | OXYGEN SATURATION: 97 %

## 2020-08-29 PROCEDURE — 99284 EMERGENCY DEPT VISIT MOD MDM: CPT

## 2020-08-29 ASSESSMENT — ENCOUNTER SYMPTOMS
BACK PAIN: 0
ABDOMINAL PAIN: 0
SHORTNESS OF BREATH: 0
COLOR CHANGE: 0
EYE PAIN: 0

## 2020-08-29 NOTE — ED NOTES
Bed: E  Expected date:   Expected time:   Means of arrival:   Comments:  [facility] [address], MOE  08/29/20 6244

## 2020-08-29 NOTE — ED NOTES
Mode of arrival (squad #, walk in, police, etc) : [facility] [address] complaint(s): Fall    Arrival Note (brief scenario, treatment PTA, etc). : Pt presents to ED by dale after he apparently fell in the hallway of a bar where he was drinking with his daughter and son-in-law. Pt does not believe he lost consciousness, but is not sure what happened exactly. Denies any complaints at this time. Pt placed on cardiac monitor and continuous pulse oximetry. Call light in reach. C= \"Have you ever felt that you should Cut down on your drinking? \"  No  A= \"Have people Annoyed you by criticizing your drinking? \"  No  G= \"Have you ever felt bad or Guilty about your drinking? \"  No  E= \"Have you ever had a drink as an Eye-opener first thing in the morning to steady your nerves or to help a hangover? \"  No      Deferred []      Reason for deferring: N/A    *If yes to two or more: probable alcohol abuse. Alecia Abdullahi RN  08/29/20 9507

## 2020-08-29 NOTE — LETTER
[address] ED  [address] [facility] [address]  Phone: [phone]    Patient: Roya Sharma  YOB: 1950  Date: 8/29/2020 Time: 7:19 PM    Leaving the Hospital Against Medical Advice    Chart #:[id number]    This will certify that I, the undersigned,    ______________________________________________________________________    A patient in the above named medical center, having requested discharge and removal from the medical center against the advice of my attending physician(s), hereby release the Emergency Department, its physicians, officers and employees, severally and individually, from any and all liability of any nature whatsoever for any injury or harm or complication of any kind that may result directly or indirectly, by reason of my terminating my stay as a patient from [facility], and hereby waive any and all rights of action I may now have or later acquire as a result of my voluntary departure from [facility] and the termination of my stay as a patient therein. This release is made with the full knowledge of the danger that may result from the action which I am taking.       Date:_______________________                         ___________________________                                                                                    Patient/Legal Representative    Witness:        ____________________________                          ___________________________  Nurse                                                                        Physician

## 2020-08-29 NOTE — ED NOTES
Pt requesting to leave AMA. Dr [facility] [employer] [facility] notified. Pt informed of risks of leaving AMA. Pt verbalizes understanding. Pt daughter updated by writer on POC. Pt daughter to  pt. Pt A&O x4 and ambulatory with steady, even gait. Pt IV removed and monitor removed.       Leana Brumfield RN  08/29/20 1919

## 2020-08-30 NOTE — ED PROVIDER NOTES
EMERGENCY DEPARTMENT ENCOUNTER    Pt Name: Dinah Sanchez  MRN: [id number]  [place] 1950  Date of evaluation: 8/29/20  CHIEF COMPLAINT       Chief Complaint   Patient presents with    Fall    Alcohol Intoxication     HISTORY OF PRESENT ILLNESS   56-year-old male presents with complaint of possible fall. Patient was reportedly drinking at a bar today patient states he went out to his car and became nauseous and felt like he was going to vomit. Patient walked back into the bar to go to the bathroom, where he reportedly fell back. Patient denies falling, states he was only sitting down because he was tired. Patient denies hitting his head, denies neck pain or LOC. Patient denies other complaints at this time    The history is provided by the patient. REVIEW OF SYSTEMS     Review of Systems   Constitutional: Negative for chills and fever. HENT: Negative for congestion and ear pain. Eyes: Negative for pain. Respiratory: Negative for shortness of breath. Cardiovascular: Negative for chest pain, palpitations and leg swelling. Gastrointestinal: Negative for abdominal pain. Genitourinary: Negative for dysuria and flank pain. Musculoskeletal: Negative for back pain. Skin: Negative for color change. Neurological: Negative for numbness and headaches. Psychiatric/Behavioral: Negative for confusion. All other systems reviewed and are negative.     PASTMEDICAL HISTORY     Past Medical History:   Diagnosis Date    Arthritis     COPD (chronic obstructive pulmonary disease) ([facility] [address]     Hypertension     MRSA (methicillin resistant staph aureus) culture positive 11/08/2017    urine    Substance abuse (HCC)     beer, cocaine     Past Problem List  Patient Active Problem List   Diagnosis Code    Idiopathic hypotension I95.0    Encephalopathy, toxic secondary to alcohol abuse G92    Substance intoxication without complication ([facility] [address] O16.538    [facility] N17.9    Alcoholic intoxication without complication ([facility]) H15.873    Altered mental status R41.82    Gastroenteritis with Cryptosporidiosis  A07.2    COPD exacerbation ([facility]) J44.1    Accidental drug overdose T50.901A     SURGICAL HISTORY       Past Surgical History:   Procedure Laterality Date    ORTHOPEDIC SURGERY Left     Pins in left elbow. CURRENT MEDICATIONS       Discharge Medication List as of 2020  7:23 PM      CONTINUE these medications which have NOT CHANGED    Details   ibuprofen (ADVIL;MOTRIN) 800 MG tablet Take 1 tablet by mouth every 8 hours as needed for Pain, Disp-30 tablet, [id number]      folic acid (FOLVITE) 1 MG tablet Take 1 tablet by mouth daily, Disp-30 tablet, R-3Normal      thiamine 100 MG tablet Take 1 tablet by mouth daily, Disp-30 tablet, R-3Normal      tiotropium (SPIRIVA HANDIHALER) 18 MCG inhalation capsule Inhale 1 capsule into the lungs daily, Disp-90 capsule, R-1Normal      fluticasone (FLONASE) 50 MCG/ACT nasal spray 1 spray by Each Nostril route daily, Disp-2 Bottle, R-1Normal      budesonide-formoterol (SYMBICORT) 160-4.5 MCG/ACT AERO Inhale 2 puffs into the lungs 2 times daily, Disp-3 Inhaler, R-1Normal      diclofenac sodium (VOLTAREN) 1 % GEL Apply 4 g topically 4 times daily, Topical, 4 TIMES DAILY Starting 2017, Until 2017, 120 doses, Disp-5 Tube, R-0, Normal           ALLERGIES     has No Known Allergies. FAMILY HISTORY     He indicated that his mother is . He indicated that his sister is . He indicated that his brother is alive. SOCIAL HISTORY       Social History     Tobacco Use    Smoking status: Current Every Day Smoker     Packs/day: 0.50     Years: 50.00     Pack years: 25.00     Types: Cigarettes    Smokeless tobacco: Never Used   Substance Use Topics    Alcohol use:  Yes     Alcohol/week: 6.0 standard drinks     Types: 6 Cans of beer per week     Comment: 6 beers daily    Drug use: Yes     Types: Cocaine, Marijuana Comment: Pt. states hasn't used for last 6 months     PHYSICAL EXAM     INITIAL VITALS: /77   Pulse 85   Temp 97.4 °F (36.3 °C) (Oral)   Resp 15   Wt 145 lb (65.8 kg)   SpO2 97%   BMI 19.13 kg/m²    Physical Exam  Vitals signs and nursing note reviewed. Constitutional:       Appearance: Normal appearance. HENT:      Head: Normocephalic and atraumatic. Right Ear: External ear normal.      Left Ear: External ear normal.      Nose: Nose normal.      Mouth/Throat:      Mouth: Mucous membranes are moist.   Eyes:      Pupils: Pupils are equal, round, and reactive to light. Neck:      Musculoskeletal: Neck supple. Cardiovascular:      Rate and Rhythm: Normal rate and regular rhythm. Pulses: Normal pulses. Heart sounds: Normal heart sounds. Pulmonary:      Effort: Pulmonary effort is normal.      Breath sounds: Normal breath sounds. Abdominal:      General: Abdomen is flat. Palpations: Abdomen is soft. Tenderness: There is no abdominal tenderness. Musculoskeletal: Normal range of motion. General: No tenderness. Skin:     General: Skin is warm and dry. Capillary Refill: Capillary refill takes less than 2 seconds. Neurological:      General: No focal deficit present. Mental Status: He is alert and oriented to person, place, and time. Mental status is at baseline. Cranial Nerves: No cranial nerve deficit. Sensory: No sensory deficit. Motor: No weakness. Coordination: Coordination normal.      Gait: Gait is intact. Gait normal.   Psychiatric:         Attention and Perception: Attention and perception normal.         Mood and Affect: Mood normal.         Speech: Speech normal.         Behavior: Behavior normal.         Thought Content: Thought content normal.         Cognition and Memory: Cognition normal.         MEDICAL DECISION MAKIN-year-old male presents with complaint of possible fall.   On initial exam patient alert and oriented in no acute distress. Will obtain blood work and CT. Informed by nurse that patient does not want blood work or CT performed at this time and would like to leave AMA. Patient is alert and oriented with steady gait and will sign out AMA at this time. The patient has decided to leave against medical advice and is refusing all further workup and testing. The patient has normal mental status and adequate capacity to make medical decisions and has the capacity to make decisions. The patient refuses hospital admission and wants to be discharged. The risks have been explained to the patient, including worsening illness, chronic pain, permanent disability, loss of organs, and death. The benefits of further testing and admission have also been explained, including the availability and proximity of nurses, physicians, monitoring, diagnostic testing, and treatment. The patient was able to understand and state the risks and benefits of hospital admission. This was witnessed by the nurse and me. The patient had the opportunity to ask questions about medical conditions. The patient was treated to the extent that the patient allowed, and knows that may return for care at any time. Follow up has been discussed patient will see pcp tomorrow. CRITICAL CARE:       PROCEDURES:    Procedures    DIAGNOSTIC RESULTS   EKG:All EKG's are interpreted by the Emergency Department Physician who either signs or Co-signs this chart in the absence of a cardiologist.        RADIOLOGY:All plain film, CT, MRI, and formal ultrasound images (except ED bedside ultrasound) are read by the radiologist, see reports below, unless otherwisenoted in MDM or here. No orders to display     LABS: All lab results were reviewed by myself, and all abnormals are listed below.   Labs Reviewed   CBC WITH AUTO DIFFERENTIAL   BASIC METABOLIC PANEL W/ REFLEX TO MG FOR LOW K   TROPONIN   BRAIN NATRIURETIC PEPTIDE       EMERGENCY DEPARTMENTCOURSE:         Vitals:    Vitals:    08/29/20 1813 08/29/20 1900   BP: 111/72 123/77   Pulse: 74 85   Resp: 18 15   Temp: 97.4 °F (36.3 °C)    TempSrc: Oral    SpO2: 96% 97%   Weight: 145 lb (65.8 kg)        The patient was given the following medications while in the emergency department:  No orders of the defined types were placed in this encounter. CONSULTS:  None    FINAL IMPRESSION      1. Fall, initial encounter          DISPOSITION/PLAN   DISPOSITION [place] 08/29/2020 07:22:42 PM      PATIENT REFERRED TO:  No follow-up provider specified.   DISCHARGE MEDICATIONS:  Discharge Medication List as of 8/29/2020  7:23 PM        John Anderson DO  Attending Emergency Physician                  John Anderson DO  08/29/20 8295

## 2021-04-21 ENCOUNTER — HOSPITAL ENCOUNTER (EMERGENCY)
Age: 71
Discharge: HOME | End: 2021-04-21
Attending: EMERGENCY MEDICINE
Payer: MEDICARE

## 2021-04-21 VITALS
SYSTOLIC BLOOD PRESSURE: 138 MMHG | TEMPERATURE: 98.2 F | BODY MASS INDEX: 19.13 KG/M2 | HEART RATE: 72 BPM | DIASTOLIC BLOOD PRESSURE: 76 MMHG | OXYGEN SATURATION: 100 % | WEIGHT: 145 LBS | RESPIRATION RATE: 16 BRPM

## 2021-04-21 DIAGNOSIS — K04.7 DENTAL ABSCESS: Primary | ICD-10-CM

## 2021-04-21 PROCEDURE — 41800 DRAINAGE OF GUM LESION: CPT

## 2021-04-21 PROCEDURE — 6370000000 HC RX 637 (ALT 250 FOR IP): Performed by: EMERGENCY MEDICINE

## 2021-04-21 PROCEDURE — 99285 EMERGENCY DEPT VISIT HI MDM: CPT

## 2021-04-21 RX ORDER — PENICILLIN V POTASSIUM 500 MG/1
500 TABLET ORAL 4 TIMES DAILY
Qty: 28 TABLET | Refills: 0 | Status: SHIPPED | OUTPATIENT
Start: 2021-04-21 | End: 2021-04-28

## 2021-04-21 RX ORDER — ACETAMINOPHEN 325 MG/1
650 TABLET ORAL EVERY 6 HOURS PRN
Qty: 20 TABLET | Refills: 0 | Status: SHIPPED | OUTPATIENT
Start: 2021-04-21

## 2021-04-21 RX ORDER — PENICILLIN V POTASSIUM 250 MG/1
500 TABLET ORAL ONCE
Status: COMPLETED | OUTPATIENT
Start: 2021-04-21 | End: 2021-04-21

## 2021-04-21 RX ORDER — OXYCODONE HYDROCHLORIDE AND ACETAMINOPHEN 5; 325 MG/1; MG/1
1 TABLET ORAL ONCE
Status: COMPLETED | OUTPATIENT
Start: 2021-04-21 | End: 2021-04-21

## 2021-04-21 RX ADMIN — PENICILLIN V POTASSIUM 500 MG: 250 TABLET ORAL at 09:01

## 2021-04-21 RX ADMIN — BENZOCAINE 1 EACH: 220 GEL, DENTIFRICE DENTAL at 09:01

## 2021-04-21 RX ADMIN — OXYCODONE HYDROCHLORIDE AND ACETAMINOPHEN 1 TABLET: 5; 325 TABLET ORAL at 09:01

## 2021-04-21 ASSESSMENT — ENCOUNTER SYMPTOMS
CONSTIPATION: 0
FACIAL SWELLING: 1
SORE THROAT: 0
VOICE CHANGE: 0
COUGH: 0
NAUSEA: 0
WHEEZING: 0
RHINORRHEA: 0
ABDOMINAL DISTENTION: 0
VOMITING: 0
DIARRHEA: 0
SHORTNESS OF BREATH: 0
TROUBLE SWALLOWING: 0

## 2021-04-21 ASSESSMENT — PAIN SCALES - GENERAL: PAINLEVEL_OUTOF10: 7

## 2021-04-21 ASSESSMENT — PAIN DESCRIPTION - PAIN TYPE: TYPE: ACUTE PAIN

## 2021-04-21 NOTE — ED NOTES
Pt to ED with complaints of dental abscess. Pt states the pain is a 7/10 at this time. Pt states he does not have a dentist. Pt with no trouble swallowing or handling secretions. Vitals obtained. Triage complete.      Angela Abbasi RN  04/21/21 0969

## 2021-04-21 NOTE — ED PROVIDER NOTES
[facility]  ED  Emergency Department        Pt Name: Olamide Hinkle  MRN: [id number]  [place] 1950  Date of evaluation: 4/21/21    CHIEF COMPLAINT       Chief Complaint   Patient presents with    Abscess     Right side of mouth       HISTORY OF PRESENT ILLNESS  (Location/Symptom, Timing/Onset, Context/Setting, Quality, Duration, ModifyingFactors, Severity.)      Olamide Hinkle is a 72 y.o. male who presents with pain and swelling to his right lower mouth. Patient states ongoing over the past 3 days. No fevers no pain with opening his mouth, he is tolerating oral intake and able to eat but pain when chewing on that side. Does not follow with a dentist.  Denies a history of IV drug use has not taken anything for pain    PAST MEDICAL / SURGICAL / SOCIAL / FAMILY HISTORY      has a past medical history of Arthritis, COPD (chronic obstructive pulmonary disease) ([facility] [address] Hypertension, MRSA (methicillin resistant staph aureus) culture positive, and Substance abuse ([facility] has a past surgical history that includes orthopedic surgery (Left). Social History     Socioeconomic History    Marital status:      Spouse name: Not on file    Number of children: Not on file    Years of education: Not on file    Highest education level: Not on file   Occupational History    Not on file   Social Needs    Financial resource strain: Not on file    Food insecurity     Worry: Not on file     Inability: Not on file    Transportation needs     Medical: Not on file     Non-medical: Not on file   Tobacco Use    Smoking status: Current Every Day Smoker     Packs/day: 0.50     Years: 50.00     Pack years: 25.00     Types: Cigarettes    Smokeless tobacco: Never Used   Substance and Sexual Activity    Alcohol use:  Yes     Alcohol/week: 6.0 standard drinks     Types: 6 Cans of beer per week     Comment: 6 beers daily    Drug use: Yes     Types: Cocaine, Marijuana     Comment: Pt. states hasn't used for last 6 months    Sexual activity: Not on file   Lifestyle    Physical activity     Days per week: Not on file     Minutes per session: Not on file    Stress: Not on file   Relationships    Social connections     Talks on phone: Not on file     Gets together: Not on file     Attends Faith service: Not on file     Active member of club or organization: Not on file     Attends meetings of clubs or organizations: Not on file     Relationship status: Not on file    Intimate partner violence     Fear of current or ex partner: Not on file     Emotionally abused: Not on file     Physically abused: Not on file     Forced sexual activity: Not on file   Other Topics Concern    Not on file   Social History Narrative    Not on file       Family History   Problem Relation Age of Onset    Other Mother         homicide    Heart Disease Sister     Kidney Disease Sister     Other Sister         drug abuse    Other Brother         ivda       Allergies:  Patient has no known allergies. Home Medications:  Prior to Admission medications    Medication Sig Start Date End Date Taking?  Authorizing Provider   ibuprofen (ADVIL;MOTRIN) 800 MG tablet Take 1 tablet by mouth every 8 hours as needed for Pain 12/27/19   Alvino Benoit MD   folic acid (FOLVITE) 1 MG tablet Take 1 tablet by mouth daily 9/23/19   Kristin More MD   thiamine 100 MG tablet Take 1 tablet by mouth daily 9/24/19   Kristin More MD   tiotropium (Lillette Jennifer) 18 MCG inhalation capsule Inhale 1 capsule into the lungs daily 9/23/19   Kristin More MD   fluticasone Loral Aschoff) 50 MCG/ACT nasal spray 1 spray by Each Nostril route daily 9/23/19   Kristin More MD   budesonide-formoterol [facility]) 160-4.5 MCG/ACT AERO Inhale 2 puffs into the lungs 2 times daily 9/23/19   Kristin More MD   diclofenac sodium (VOLTAREN) 1 % GEL Apply 4 g topically 4 times daily 11/13/17 12/13/17  Harvey Carson MD       REVIEW OF SYSTEMS    (2-9 systems for level 4, 10 or more for abdominal tenderness. There is no guarding or rebound. Skin:     General: Skin is warm and dry. Findings: No rash. Neurological:      Mental Status: He is alert and oriented to person, place, and time. DIFFERENTIAL  DIAGNOSIS     Patient with swelling noted to the face, and abscess from dental infection we will plan on antibiotics. We will do topical benzocaine and plan on incision for drainage. Patient will need follow-up with dentistry for extraction of tooth. Pain control with Percocet at this time    PLAN (LABS / IMAGING / EKG):  No orders of the defined types were placed in this encounter. MEDICATIONS ORDERED:  Orders Placed This Encounter   Medications    benzocaine (LOLLICAINE) 20 % dental swab    oxyCODONE-acetaminophen (PERCOCET) 5-325 MG per tablet 1 tablet    penicillin v potassium (VEETID) tablet 500 mg     Order Specific Question:   Antimicrobial Indications     Answer:   Head and Neck Infection       DIAGNOSTIC RESULTS / EMERGENCY DEPARTMENT COURSE / MDM     LABS:  No results found for this visit on 04/21/21. IMPRESSION: dental abscess    POC ULTRASOUND:      EMERGENCY DEPARTMENT COURSE:  Patient with minimal relief after I/D did not tolerate well, and only bloody drainage noted. Plan for outpatient follow up at dental clinic    PROCEDURES:  Incision and Drainage Procedure Note    Indication: Abscess    Procedure: The patient was positioned appropriately . Local anesthesia was topical benzocaine. An incision was then made over the greatest area of fluctuance and approximately 2 cc of bloody material was expressed. Loculations were not present. The drainage cavity was then irrigated. The patient tolerated the procedure with difficulty. Complications: bleeding          CONSULTS:  None    CRITICAL CARE:      FINAL IMPRESSION      1. Dental abscess          DISPOSITION / PLAN     DISPOSITION        PATIENT REFERRED TO:  No follow-up provider specified.     DISCHARGE MEDICATIONS:  New Prescriptions    No medications on file       Emerson Krueger  9:02 AM    Attending Emergency Physician  [facility] ED    (Please note that portions of this note were completed with a voice recognition program.  Jil Marsh made to edit the dictations but occasionally words are mis-transcribed.)              Emily Alberts DO  04/21/21 [id number]